# Patient Record
Sex: MALE | Race: WHITE | NOT HISPANIC OR LATINO | Employment: OTHER | URBAN - METROPOLITAN AREA
[De-identification: names, ages, dates, MRNs, and addresses within clinical notes are randomized per-mention and may not be internally consistent; named-entity substitution may affect disease eponyms.]

---

## 2017-04-19 ENCOUNTER — COMMUNICATION - HEALTHEAST (OUTPATIENT)
Dept: INTERNAL MEDICINE | Facility: CLINIC | Age: 62
End: 2017-04-19

## 2017-04-19 DIAGNOSIS — I10 ESSENTIAL HYPERTENSION: ICD-10-CM

## 2017-04-20 ENCOUNTER — COMMUNICATION - HEALTHEAST (OUTPATIENT)
Dept: INTERNAL MEDICINE | Facility: CLINIC | Age: 62
End: 2017-04-20

## 2017-04-23 ENCOUNTER — COMMUNICATION - HEALTHEAST (OUTPATIENT)
Dept: INTERNAL MEDICINE | Facility: CLINIC | Age: 62
End: 2017-04-23

## 2017-04-23 DIAGNOSIS — I10 ESSENTIAL HYPERTENSION: ICD-10-CM

## 2017-08-04 ENCOUNTER — OFFICE VISIT - HEALTHEAST (OUTPATIENT)
Dept: INTERNAL MEDICINE | Facility: CLINIC | Age: 62
End: 2017-08-04

## 2017-08-04 DIAGNOSIS — Z51.81 MEDICATION MONITORING ENCOUNTER: ICD-10-CM

## 2017-08-04 DIAGNOSIS — I10 HYPERTENSION: ICD-10-CM

## 2017-08-04 DIAGNOSIS — I45.2 RBBB (RIGHT BUNDLE BRANCH BLOCK WITH LEFT ANTERIOR FASCICULAR BLOCK): ICD-10-CM

## 2017-08-04 DIAGNOSIS — I10 ESSENTIAL HYPERTENSION WITH GOAL BLOOD PRESSURE LESS THAN 140/90: ICD-10-CM

## 2017-08-04 DIAGNOSIS — Z12.5 PROSTATE CANCER SCREENING: ICD-10-CM

## 2017-08-04 DIAGNOSIS — R35.0 URINARY FREQUENCY: ICD-10-CM

## 2017-08-04 DIAGNOSIS — Z13.6 ENCOUNTER FOR ABDOMINAL AORTIC ANEURYSM (AAA) SCREENING: ICD-10-CM

## 2017-08-04 LAB
ATRIAL RATE - MUSE: 54 BPM
CHOLEST SERPL-MCNC: 188 MG/DL
DIASTOLIC BLOOD PRESSURE - MUSE: NORMAL MMHG
FASTING STATUS PATIENT QL REPORTED: YES
HDLC SERPL-MCNC: 52 MG/DL
INTERPRETATION ECG - MUSE: NORMAL
LDLC SERPL CALC-MCNC: 123 MG/DL
P AXIS - MUSE: 24 DEGREES
PR INTERVAL - MUSE: 178 MS
PSA SERPL-MCNC: 0.5 NG/ML (ref 0–4.5)
QRS DURATION - MUSE: 146 MS
QT - MUSE: 448 MS
QTC - MUSE: 424 MS
R AXIS - MUSE: -53 DEGREES
SYSTOLIC BLOOD PRESSURE - MUSE: NORMAL MMHG
T AXIS - MUSE: 22 DEGREES
TRIGL SERPL-MCNC: 66 MG/DL
VENTRICULAR RATE- MUSE: 54 BPM

## 2017-08-04 ASSESSMENT — MIFFLIN-ST. JEOR: SCORE: 1579.12

## 2017-08-07 ENCOUNTER — COMMUNICATION - HEALTHEAST (OUTPATIENT)
Dept: INTERNAL MEDICINE | Facility: CLINIC | Age: 62
End: 2017-08-07

## 2017-10-04 ENCOUNTER — AMBULATORY - HEALTHEAST (OUTPATIENT)
Dept: NURSING | Facility: CLINIC | Age: 62
End: 2017-10-04

## 2017-10-04 DIAGNOSIS — Z23 INFLUENZA VACCINATION GIVEN: ICD-10-CM

## 2017-11-27 ENCOUNTER — OFFICE VISIT - HEALTHEAST (OUTPATIENT)
Dept: INTERNAL MEDICINE | Facility: CLINIC | Age: 62
End: 2017-11-27

## 2017-11-27 DIAGNOSIS — L40.4 PSORIASIS, GUTTATE: ICD-10-CM

## 2017-11-27 ASSESSMENT — MIFFLIN-ST. JEOR: SCORE: 1585.47

## 2018-05-22 ENCOUNTER — COMMUNICATION - HEALTHEAST (OUTPATIENT)
Dept: INTERNAL MEDICINE | Facility: CLINIC | Age: 63
End: 2018-05-22

## 2018-05-22 DIAGNOSIS — Z13.6 ENCOUNTER FOR ABDOMINAL AORTIC ANEURYSM (AAA) SCREENING: ICD-10-CM

## 2018-06-01 ENCOUNTER — COMMUNICATION - HEALTHEAST (OUTPATIENT)
Dept: VASCULAR SURGERY | Facility: CLINIC | Age: 63
End: 2018-06-01

## 2018-06-19 ENCOUNTER — COMMUNICATION - HEALTHEAST (OUTPATIENT)
Dept: VASCULAR SURGERY | Facility: CLINIC | Age: 63
End: 2018-06-19

## 2018-07-19 ENCOUNTER — COMMUNICATION - HEALTHEAST (OUTPATIENT)
Dept: INTERNAL MEDICINE | Facility: CLINIC | Age: 63
End: 2018-07-19

## 2018-07-19 ENCOUNTER — HOSPITAL ENCOUNTER (OUTPATIENT)
Dept: ULTRASOUND IMAGING | Facility: HOSPITAL | Age: 63
Discharge: HOME | End: 2018-07-19
Attending: INTERNAL MEDICINE

## 2018-07-19 DIAGNOSIS — Z13.6 ENCOUNTER FOR ABDOMINAL AORTIC ANEURYSM (AAA) SCREENING: ICD-10-CM

## 2018-09-20 ENCOUNTER — AMBULATORY - HEALTHEAST (OUTPATIENT)
Dept: NURSING | Facility: CLINIC | Age: 63
End: 2018-09-20

## 2018-09-20 DIAGNOSIS — Z23 FLU VACCINE NEED: ICD-10-CM

## 2018-09-23 ENCOUNTER — COMMUNICATION - HEALTHEAST (OUTPATIENT)
Dept: INTERNAL MEDICINE | Facility: CLINIC | Age: 63
End: 2018-09-23

## 2018-09-23 DIAGNOSIS — I10 ESSENTIAL HYPERTENSION WITH GOAL BLOOD PRESSURE LESS THAN 140/90: ICD-10-CM

## 2018-09-23 DIAGNOSIS — I10 HYPERTENSION: ICD-10-CM

## 2018-10-15 ENCOUNTER — COMMUNICATION - HEALTHEAST (OUTPATIENT)
Dept: INTERNAL MEDICINE | Facility: CLINIC | Age: 63
End: 2018-10-15

## 2018-11-26 ENCOUNTER — OFFICE VISIT - HEALTHEAST (OUTPATIENT)
Dept: INTERNAL MEDICINE | Facility: CLINIC | Age: 63
End: 2018-11-26

## 2018-11-26 DIAGNOSIS — D12.6 BENIGN NEOPLASM OF COLON, UNSPECIFIED PART OF COLON: ICD-10-CM

## 2018-11-26 DIAGNOSIS — Z12.5 SCREENING FOR PROSTATE CANCER: ICD-10-CM

## 2018-11-26 DIAGNOSIS — Z51.81 ENCOUNTER FOR MEDICATION MONITORING: ICD-10-CM

## 2018-11-26 DIAGNOSIS — I10 ESSENTIAL HYPERTENSION: ICD-10-CM

## 2018-11-26 DIAGNOSIS — I45.2 RBBB WITH LEFT ANTERIOR FASCICULAR BLOCK: ICD-10-CM

## 2018-11-26 DIAGNOSIS — R94.31 NONSPECIFIC ABNORMAL ELECTROCARDIOGRAM (ECG) (EKG): ICD-10-CM

## 2018-11-26 DIAGNOSIS — I45.2 BIFASCICULAR BUNDLE BRANCH BLOCK: ICD-10-CM

## 2018-11-26 DIAGNOSIS — Z00.00 HEALTHCARE MAINTENANCE: ICD-10-CM

## 2018-11-26 LAB
ALBUMIN SERPL-MCNC: 4.1 G/DL (ref 3.5–5)
ALBUMIN UR-MCNC: NEGATIVE MG/DL
ALP SERPL-CCNC: 52 U/L (ref 45–120)
ALT SERPL W P-5'-P-CCNC: 20 U/L (ref 0–45)
ANION GAP SERPL CALCULATED.3IONS-SCNC: 11 MMOL/L (ref 5–18)
APPEARANCE UR: CLEAR
AST SERPL W P-5'-P-CCNC: 21 U/L (ref 0–40)
BACTERIA #/AREA URNS HPF: ABNORMAL HPF
BILIRUB SERPL-MCNC: 0.6 MG/DL (ref 0–1)
BILIRUB UR QL STRIP: NEGATIVE
BUN SERPL-MCNC: 16 MG/DL (ref 8–22)
CALCIUM SERPL-MCNC: 10.1 MG/DL (ref 8.5–10.5)
CHLORIDE BLD-SCNC: 105 MMOL/L (ref 98–107)
CHOLEST SERPL-MCNC: 212 MG/DL
CO2 SERPL-SCNC: 26 MMOL/L (ref 22–31)
COLOR UR AUTO: YELLOW
CREAT SERPL-MCNC: 0.88 MG/DL (ref 0.7–1.3)
ERYTHROCYTE [DISTWIDTH] IN BLOOD BY AUTOMATED COUNT: 11.4 % (ref 11–14.5)
FASTING STATUS PATIENT QL REPORTED: NO
GFR SERPL CREATININE-BSD FRML MDRD: >60 ML/MIN/1.73M2
GLUCOSE BLD-MCNC: 86 MG/DL (ref 70–125)
GLUCOSE UR STRIP-MCNC: NEGATIVE MG/DL
HCT VFR BLD AUTO: 47.4 % (ref 40–54)
HDLC SERPL-MCNC: 61 MG/DL
HGB BLD-MCNC: 15.9 G/DL (ref 14–18)
HGB UR QL STRIP: ABNORMAL
KETONES UR STRIP-MCNC: NEGATIVE MG/DL
LDLC SERPL CALC-MCNC: 130 MG/DL
LEUKOCYTE ESTERASE UR QL STRIP: NEGATIVE
MCH RBC QN AUTO: 31.3 PG (ref 27–34)
MCHC RBC AUTO-ENTMCNC: 33.5 G/DL (ref 32–36)
MCV RBC AUTO: 93 FL (ref 80–100)
NITRATE UR QL: NEGATIVE
PH UR STRIP: 7 [PH] (ref 5–8)
PLATELET # BLD AUTO: 210 THOU/UL (ref 140–440)
PMV BLD AUTO: 6.4 FL (ref 7–10)
POTASSIUM BLD-SCNC: 4.3 MMOL/L (ref 3.5–5)
PROT SERPL-MCNC: 7.1 G/DL (ref 6–8)
PSA SERPL-MCNC: 0.4 NG/ML (ref 0–4.5)
RBC # BLD AUTO: 5.08 MILL/UL (ref 4.4–6.2)
RBC #/AREA URNS AUTO: ABNORMAL HPF
SODIUM SERPL-SCNC: 142 MMOL/L (ref 136–145)
SP GR UR STRIP: 1.02 (ref 1–1.03)
SQUAMOUS #/AREA URNS AUTO: ABNORMAL LPF
TRIGL SERPL-MCNC: 103 MG/DL
UROBILINOGEN UR STRIP-ACNC: ABNORMAL
WBC #/AREA URNS AUTO: ABNORMAL HPF
WBC: 6.8 THOU/UL (ref 4–11)

## 2018-11-27 LAB
ATRIAL RATE - MUSE: 55 BPM
DIASTOLIC BLOOD PRESSURE - MUSE: NORMAL MMHG
INTERPRETATION ECG - MUSE: NORMAL
P AXIS - MUSE: 18 DEGREES
PR INTERVAL - MUSE: 200 MS
QRS DURATION - MUSE: 136 MS
QT - MUSE: 442 MS
QTC - MUSE: 422 MS
R AXIS - MUSE: -39 DEGREES
SYSTOLIC BLOOD PRESSURE - MUSE: NORMAL MMHG
T AXIS - MUSE: 10 DEGREES
VENTRICULAR RATE- MUSE: 55 BPM

## 2018-11-28 ENCOUNTER — COMMUNICATION - HEALTHEAST (OUTPATIENT)
Dept: INTERNAL MEDICINE | Facility: CLINIC | Age: 63
End: 2018-11-28

## 2019-06-04 ENCOUNTER — OFFICE VISIT - HEALTHEAST (OUTPATIENT)
Dept: INTERNAL MEDICINE | Facility: CLINIC | Age: 64
End: 2019-06-04

## 2019-06-04 DIAGNOSIS — I10 ESSENTIAL HYPERTENSION, BENIGN: ICD-10-CM

## 2019-06-04 ASSESSMENT — MIFFLIN-ST. JEOR: SCORE: 1558.42

## 2019-08-07 ENCOUNTER — COMMUNICATION - HEALTHEAST (OUTPATIENT)
Dept: SCHEDULING | Facility: CLINIC | Age: 64
End: 2019-08-07

## 2019-08-12 ENCOUNTER — OFFICE VISIT - HEALTHEAST (OUTPATIENT)
Dept: FAMILY MEDICINE | Facility: CLINIC | Age: 64
End: 2019-08-12

## 2019-08-12 DIAGNOSIS — L30.9 DERMATITIS: ICD-10-CM

## 2019-08-12 DIAGNOSIS — B35.4 TINEA CORPORIS: ICD-10-CM

## 2019-08-12 ASSESSMENT — MIFFLIN-ST. JEOR: SCORE: 1546

## 2019-09-10 ENCOUNTER — AMBULATORY - HEALTHEAST (OUTPATIENT)
Dept: NURSING | Facility: CLINIC | Age: 64
End: 2019-09-10

## 2019-09-10 DIAGNOSIS — Z23 FLU VACCINE NEED: ICD-10-CM

## 2019-09-11 ENCOUNTER — COMMUNICATION - HEALTHEAST (OUTPATIENT)
Dept: INTERNAL MEDICINE | Facility: CLINIC | Age: 64
End: 2019-09-11

## 2019-09-11 DIAGNOSIS — I10 ESSENTIAL HYPERTENSION, BENIGN: ICD-10-CM

## 2019-09-30 ENCOUNTER — COMMUNICATION - HEALTHEAST (OUTPATIENT)
Dept: SCHEDULING | Facility: CLINIC | Age: 64
End: 2019-09-30

## 2019-12-06 ENCOUNTER — COMMUNICATION - HEALTHEAST (OUTPATIENT)
Dept: SCHEDULING | Facility: CLINIC | Age: 64
End: 2019-12-06

## 2019-12-13 ENCOUNTER — OFFICE VISIT - HEALTHEAST (OUTPATIENT)
Dept: INTERNAL MEDICINE | Facility: CLINIC | Age: 64
End: 2019-12-13

## 2019-12-13 ENCOUNTER — COMMUNICATION - HEALTHEAST (OUTPATIENT)
Dept: INTERNAL MEDICINE | Facility: CLINIC | Age: 64
End: 2019-12-13

## 2019-12-13 DIAGNOSIS — K59.01 SLOW TRANSIT CONSTIPATION: ICD-10-CM

## 2019-12-13 DIAGNOSIS — Z80.0 FHX: COLON CANCER: ICD-10-CM

## 2019-12-13 DIAGNOSIS — Z12.11 SCREEN FOR COLON CANCER: ICD-10-CM

## 2019-12-13 LAB
ERYTHROCYTE [DISTWIDTH] IN BLOOD BY AUTOMATED COUNT: 10.9 % (ref 11–14.5)
HCT VFR BLD AUTO: 47.9 % (ref 40–54)
HGB BLD-MCNC: 16.2 G/DL (ref 14–18)
MCH RBC QN AUTO: 32.8 PG (ref 27–34)
MCHC RBC AUTO-ENTMCNC: 33.9 G/DL (ref 32–36)
MCV RBC AUTO: 97 FL (ref 80–100)
PLATELET # BLD AUTO: 255 THOU/UL (ref 140–440)
PMV BLD AUTO: 6.7 FL (ref 7–10)
RBC # BLD AUTO: 4.94 MILL/UL (ref 4.4–6.2)
TSH SERPL DL<=0.005 MIU/L-ACNC: 1.3 UIU/ML (ref 0.3–5)
WBC: 5.5 THOU/UL (ref 4–11)

## 2020-03-04 ENCOUNTER — COMMUNICATION - HEALTHEAST (OUTPATIENT)
Dept: SCHEDULING | Facility: CLINIC | Age: 65
End: 2020-03-04

## 2020-03-04 ENCOUNTER — OFFICE VISIT - HEALTHEAST (OUTPATIENT)
Dept: FAMILY MEDICINE | Facility: CLINIC | Age: 65
End: 2020-03-04

## 2020-03-04 DIAGNOSIS — R52 BODY ACHES: ICD-10-CM

## 2020-03-04 DIAGNOSIS — R50.9 FEVER, UNSPECIFIED FEVER CAUSE: ICD-10-CM

## 2020-03-04 LAB
FLUAV AG SPEC QL IA: NORMAL
FLUBV AG SPEC QL IA: NORMAL

## 2020-03-04 ASSESSMENT — MIFFLIN-ST. JEOR: SCORE: 1566.64

## 2020-04-06 ENCOUNTER — RECORDS - HEALTHEAST (OUTPATIENT)
Dept: ADMINISTRATIVE | Facility: OTHER | Age: 65
End: 2020-04-06

## 2020-08-20 ENCOUNTER — COMMUNICATION - HEALTHEAST (OUTPATIENT)
Dept: INTERNAL MEDICINE | Facility: CLINIC | Age: 65
End: 2020-08-20

## 2020-08-20 DIAGNOSIS — Z23 NEED FOR SHINGLES VACCINE: ICD-10-CM

## 2020-09-18 ENCOUNTER — OFFICE VISIT - HEALTHEAST (OUTPATIENT)
Dept: FAMILY MEDICINE | Facility: CLINIC | Age: 65
End: 2020-09-18

## 2020-09-18 ENCOUNTER — COMMUNICATION - HEALTHEAST (OUTPATIENT)
Dept: TELEHEALTH | Facility: CLINIC | Age: 65
End: 2020-09-18

## 2020-09-18 DIAGNOSIS — M77.51 TENDINITIS OF RIGHT ANKLE: ICD-10-CM

## 2020-09-18 DIAGNOSIS — M25.571 CHRONIC PAIN OF RIGHT ANKLE: ICD-10-CM

## 2020-09-18 DIAGNOSIS — G89.29 CHRONIC PAIN OF RIGHT ANKLE: ICD-10-CM

## 2020-09-23 ENCOUNTER — COMMUNICATION - HEALTHEAST (OUTPATIENT)
Dept: INTERNAL MEDICINE | Facility: CLINIC | Age: 65
End: 2020-09-23

## 2020-09-23 DIAGNOSIS — I10 ESSENTIAL HYPERTENSION, BENIGN: ICD-10-CM

## 2020-11-25 ENCOUNTER — OFFICE VISIT - HEALTHEAST (OUTPATIENT)
Dept: FAMILY MEDICINE | Facility: CLINIC | Age: 65
End: 2020-11-25

## 2020-11-25 DIAGNOSIS — Z20.822 EXPOSURE TO COVID-19 VIRUS: ICD-10-CM

## 2021-05-23 ENCOUNTER — HEALTH MAINTENANCE LETTER (OUTPATIENT)
Age: 66
End: 2021-05-23

## 2021-05-28 ENCOUNTER — RECORDS - HEALTHEAST (OUTPATIENT)
Dept: ADMINISTRATIVE | Facility: CLINIC | Age: 66
End: 2021-05-28

## 2021-05-29 NOTE — PROGRESS NOTES
ASSESSMENT:  1. Essential hypertension, benign  Vinod had losartan increased to 50 mg daily at last visit.  Most readings at home have been above goal since then.  Recheck by MD also was high.  I would favor increasing the losartan dose.  - losartan (COZAAR) 100 MG tablet; Take 1 tablet (100 mg total) by mouth daily.  Dispense: 90 tablet; Refill: 3    2.  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  Increase Losartan to 100 mg daily    2.  Report BP readings in one month    3.  See me in three months        Medications Discontinued During This Encounter   Medication Reason     losartan (COZAAR) 50 MG tablet Dose adjustment       Return in about 3 months (around 9/4/2019) for Recheck.      ASSESSED PROBLEMS:  1. Essential hypertension, benign  losartan (COZAAR) 100 MG tablet       CHIEF COMPLAINT:  Chief Complaint   Patient presents with     Follow-up       HISTORY OF PRESENT ILLNESS:  Darion is a 64 y.o. male with a history of hypertension presenting to the clinic today for a follow up.     Hypertension: He was under a lot of stressed due to work,and was suppose to be seen in Feb. He started recording his blood pressure in April. It was high, but has been decreasing. He is taking 50mg losartan to regulate his hypertension, and states that it has been working. He tolerates the losartan well. The patient also notes that he has been walking and doing chores around the house to try to help regulate his blood pressure.    Hand Mass: He has a bump on the palmar aspect of his left hand that popped up a month ago. It is not painful. He is left handed.     REVIEW OF SYSTEMS:   His heart has been normal, he denied any fluttering.  All other systems are negative.    PFSH:  He is starting work with the [employer] soon.    TOBACCO USE:  Social History     Tobacco Use   Smoking Status  "Former Smoker     Years: 3.00     Types: Cigarettes   Smokeless Tobacco Never Used       VITALS:  Vitals:    06/04/19 0814 06/04/19 0838   BP: 122/76 (!) 146/96   Patient Site: Left Arm Right Arm   Patient Position: Sitting Sitting   Cuff Size: Adult Regular    Pulse: (!) 58    SpO2: 99%    Weight: 172 lb 7 oz (78.2 kg)    Height: 5' 10\" (1.778 m)      Wt Readings from Last 3 Encounters:   06/04/19 172 lb 7 oz (78.2 kg)   11/26/18 180 lb (81.6 kg)   11/27/17 185 lb 6.4 oz (84.1 kg)     Body mass index is 24.74 kg/m .    PHYSICAL EXAM:  Repeat bp by MD: 146/96, rue, sitting  Constitutional:   Reveals an alert pleasant talkative male, affect appropriate, does not appear acutely ill.  Vitals: per nursing notes.  Neck:  Supple, no carotid bruits or adenopathy.  Back:  No spine or CVA pain.  Thorax:  No bony deformities.  Lungs: Clear to A&P without rales or wheezes.  Respiratory effort normal.  Cardiac:   Regular rate and rhythm, normal S1, S2, no murmur or gallop.  Abdomen: Soft non-tender.  Extremities:   No edema    Skin: Clear  Psychiatric:  Memory intact, mood appropriate.    ADDITIONAL HISTORY SUMMARIZED (2): None.  DECISION TO OBTAIN EXTRA INFORMATION (1): None.   RADIOLOGY TESTS (1): None.  LABS (1): Reviewed labs.  MEDICINE TESTS (1): None.  INDEPENDENT REVIEW (2 each): None.     The visit lasted a total of 14 minutes face to face with the patient. Over 50% of the time was spent counseling and educating the patient about hypertension.    ILuz, am scribing for and in the presence of, Dr. Veras.    Josiah VAN, personally performed the services described in this documentation, as scribed by Luz Hutson in my presence, and it is both accurate and complete.    Dragon dictation was used for this note.  Speech recognition errors are a possibility.    MEDICATIONS:  Current Outpatient Medications   Medication Sig Dispense Refill     ibuprofen (ADVIL,MOTRIN) 200 MG tablet Take 400 mg by mouth every 6 (six) " hours as needed for pain.       Lactobacillus acidophilus (PROBIOTIC) 10 billion cell capsule Take by mouth.       triamcinolone (KENALOG) 0.1 % cream Apply twice daily to involved area until resolved 80 g 1     losartan (COZAAR) 100 MG tablet Take 1 tablet (100 mg total) by mouth daily. 90 tablet 3     No current facility-administered medications for this visit.        Total data points: 1

## 2021-05-29 NOTE — PATIENT INSTRUCTIONS - HE
.  Increase Losartan to 100 mg daily    2.  Report BP readings in one month    3.  See me in three months

## 2021-05-31 VITALS — BODY MASS INDEX: 27.89 KG/M2 | HEIGHT: 68 IN | WEIGHT: 184 LBS

## 2021-05-31 VITALS — WEIGHT: 185.4 LBS | BODY MASS INDEX: 28.1 KG/M2 | HEIGHT: 68 IN

## 2021-05-31 NOTE — PROGRESS NOTES
Is good day  Assessment:     Darion was seen today for rash.    Diagnoses and all orders for this visit:    Tinea corporis    Dermatitis  -     ketoconazole (NIZORAL) 2 % cream; Apply twice a day for 10 days.          Plan:     1. Dermatitis  To use the ketoconazole cream to this tinea dermatitis over the next month.  I expect this to improve.  If he is having difficulty getting resolution of this tinea to come back and we will consider ketoconazole tablets.        Subjective:      Fred is a 64 y.o. male presenting to my clinic for evaluation of a rash on top of his right foot.  He has a circular rash that he says it started off the size of an eraser and now is the size of a quarter.  He does not typically wear shoes with any metal on them as I wonder about metal contact dermatitis.  He has not had a blister in his this spot though currently a layer of skin is peeling off the top.    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  I discussed use of topical antifungal first and if not sufficient switching to an oral antifungal..    Will see next week or you will see the following week      Current Outpatient Medications on File Prior to Visit   Medication Sig Dispense Refill     Lactobacillus acidophilus (PROBIOTIC) 10 billion cell capsule Take by mouth.       losartan (COZAAR) 100 MG tablet Take 1 tablet (100 mg total) by mouth daily. 90 tablet 3     ibuprofen (ADVIL,MOTRIN) 200 MG tablet Take 400 mg by mouth every 6 (six) hours as needed for pain.       triamcinolone (KENALOG) 0.1 % cream Apply twice daily to involved area until resolved 80 g 1     No current facility-administered medications on file prior to visit.      Allergies   Allergen Reactions     Lisinopril      Annotation: cough       Past Medical History:   Diagnosis Date     Fracture, rib      Past Surgical History:   Procedure Laterality Date     NO PAST SURGERIES       Social History     Socioeconomic  "History     Marital status:      Spouse name: Not on file     Number of children: Not on file     Years of education: Not on file     Highest education level: Not on file   Occupational History     Not on file   Social Needs     Financial resource strain: Not on file     Food insecurity:     Worry: Not on file     Inability: Not on file     Transportation needs:     Medical: Not on file     Non-medical: Not on file   Tobacco Use     Smoking status: Former Smoker     Years: 3.00     Types: Cigarettes     Smokeless tobacco: Never Used   Substance and Sexual Activity     Alcohol use: No     Drug use: No     Sexual activity: Not on file   Lifestyle     Physical activity:     Days per week: Not on file     Minutes per session: Not on file     Stress: Not on file   Relationships     Social connections:     Talks on phone: Not on file     Gets together: Not on file     Attends Yarsanism service: Not on file     Active member of club or organization: Not on file     Attends meetings of clubs or organizations: Not on file     Relationship status: Not on file     Intimate partner violence:     Fear of current or ex partner: Not on file     Emotionally abused: Not on file     Physically abused: Not on file     Forced sexual activity: Not on file   Other Topics Concern     Not on file   Social History Narrative     Not on file     Family History   Problem Relation Age of Onset     Cancer Mother      Cancer Father        ROS:  I have performed a 10 point ROS.  All pertinent positives and negatives are found in the HPI.  All others are negative.      Objective:     Physical Exam:  /72 (Patient Site: Right Arm, Patient Position: Sitting, Cuff Size: Adult Regular)   Pulse 60   Temp 98  F (36.7  C)   Resp 16   Ht 5' 10\" (1.778 m)   Wt 169 lb 11.2 oz (77 kg)   BMI 24.35 kg/m    General Appearance: Alert, cooperative, no distress, appears stated age      Extremities: Extremities normal, atraumatic, no cyanosis or " edema/  Rash on top of okay left right left rightright foot  That was his right foot or his right foot  Skin: Skin color, texture, turgor normal,     Lesion on the dorsum of the right foot is approximately a centimeter and a half in diameter.  Erythema erythematous overall with well demarcated edges.  A thin layer of skin over the top is blistering in a bullous-like fashion.  No weeping or drainage.

## 2021-05-31 NOTE — TELEPHONE ENCOUNTER
Patient calling - says about a month ago he noticed a round flat red area about the size of pencil eraser on top of his left foot.  This area has slowly increased in size over the last month.  The flat red area is now about the size of a dime.      No fever. No pain.  No itching.     Triaged to disposition of See PCP When Office is Open (Within 3 Days).  Patient states he is out of town in [facility] [place] until next [place].  Requesting appointment for Monday afternoon.  Caller warm transferred to scheduling.  No appointments available with Dr. Veras until 9/13/19.  Scheduled for Monday 8/12/19 at [facility].    Chiquita Salmon, OSBALDO  Triage Nurse Advisor    Reason for Disposition    Localized rash present > 7 days    Protocols used: RASH OR REDNESS - [id number]

## 2021-06-01 ENCOUNTER — COMMUNICATION - HEALTHEAST (OUTPATIENT)
Dept: SCHEDULING | Facility: CLINIC | Age: 66
End: 2021-06-01

## 2021-06-01 NOTE — TELEPHONE ENCOUNTER
Medication Question or Clarification  Who is calling: Pharmacy: Jennifer FITZPATRICK Pharmacist with [facility]  What medication are you calling about? (include dose and sig)     losartan (COZAAR) 100 MG tablet 90 tablet 3 6/4/2019     Sig - Route: Take 1 tablet (100 mg total) by mouth daily. - Oral      Who prescribed the medication?: pcp  What is your question/concern?: Since being on this dose of medication, patient reports experiencing about 5 episodes of dizziness per month. Blood pressures have been 120 over 80's non-resting. Pharmacist recommends patient trying a 75 mg dose to see if that eliminates his symptoms. Please advise!  Pharmacy: [facility]  Okay to leave a detailed message?: Yes, [phone]  Site CMT - Please call the pharmacy to obtain any additional needed information.

## 2021-06-01 NOTE — TELEPHONE ENCOUNTER
OK to cut the losartan to 75 mg daily.  (Blood pressure previously was above goal on 50 mg daily).  He would need to switch to 50 mg tab and take 1-1/2 daily.  If he has difficulty cutting the pill, he would need to take a 50 mg and separate 25 mg tab.

## 2021-06-01 NOTE — TELEPHONE ENCOUNTER
Pt calls with updated BP readings since starting new losartan dose -> 75 mg daily.    6/18/19:  122/76  HR 68    7/16/19:  121/89  HR 78    8/12/19:  118/72  HR 60    9/27/19:  120/88  HR 76    Pt feels well on 75 mg losartan daily.  No dizziness or other symptoms.  Started this new 75 mg daily dose 9/25/19 (approx five days ago).  Intends to continue this dose, confirmed in current med list.    Trupti Corona RN BS  [facility] RN Triage     Reason for Disposition    [1] Other NON-URGENT information for PCP AND [2] does not require PCP response    Protocols used: PCP CALL - NO TRIAGE-A-

## 2021-06-01 NOTE — TELEPHONE ENCOUNTER
Patient Returning Call  Reason for call:  Returning call  Information relayed to patient:    Relayed below information to patient.  Patient has additional questions:  Yes  If YES, what are your questions/concerns:    Patient states he will try cutting the losartan tablet in half for now.  Patient will let the Provider know if he wants the 25 mg tablet.  Okay to leave a detailed message?: No call back needed

## 2021-06-02 VITALS — WEIGHT: 180 LBS | BODY MASS INDEX: 27.37 KG/M2

## 2021-06-03 VITALS — HEIGHT: 70 IN | BODY MASS INDEX: 24.69 KG/M2 | WEIGHT: 172.44 LBS

## 2021-06-03 VITALS — HEIGHT: 70 IN | WEIGHT: 169.7 LBS | BODY MASS INDEX: 24.29 KG/M2

## 2021-06-04 VITALS
OXYGEN SATURATION: 98 % | HEART RATE: 60 BPM | DIASTOLIC BLOOD PRESSURE: 82 MMHG | SYSTOLIC BLOOD PRESSURE: 138 MMHG | WEIGHT: 173.8 LBS | BODY MASS INDEX: 24.94 KG/M2

## 2021-06-04 VITALS
TEMPERATURE: 98.8 F | HEIGHT: 70 IN | DIASTOLIC BLOOD PRESSURE: 74 MMHG | OXYGEN SATURATION: 98 % | RESPIRATION RATE: 16 BRPM | SYSTOLIC BLOOD PRESSURE: 110 MMHG | WEIGHT: 174.25 LBS | BODY MASS INDEX: 24.95 KG/M2 | HEART RATE: 88 BPM

## 2021-06-04 NOTE — PROGRESS NOTES
ASSESSMENT:  1. Screen for colon cancer  Colonoscopy will be scheduled  - Ambulatory referral for Colonoscopy    2. FHx: colon cancer  Family history is notable for mother with colon cancer  - Ambulatory referral for Colonoscopy    3. Slow transit constipation  Darion chronically has a bowel movement every 2 to 3 days.  This has been worse over the past several months.  Conservative management was reviewed.  He is concerned due to the family history of mother with colon cancer.  He also will be screened for hypothyroidism  - Ambulatory referral for Colonoscopy  - Thyroid Stimulating Hormone (TSH)  - HM2(CBC w/o Differential)    4.  Essential hypertension  Good control with losartan 75 mg daily    PLAN:  Patient Instructions   1. Use Citrucel or similar fiber supplement daily.    2.  Add Miralax as needed    3.  Schedule colonoscopy    4.  Check TSH and hemoglobin.    5.  See for Annual wellness check in future.    6.  Pneumo-23 vaccine today for health maintenance      Orders Placed This Encounter   Procedures     Pneumococcal polysaccharide vaccine 23-valent 1 yo or older, subq/IM     Thyroid Stimulating Hormone (TSH)     HM2(CBC w/o Differential)     Ambulatory referral for Colonoscopy     Referral Priority:   Routine     Referral Type:   Colonoscopy     Referral Reason:   Evaluation and Treatment     Requested Specialty:   Gastroenterology     Number of Visits Requested:   1     There are no discontinued medications.    Return in about 4 weeks (around 1/10/2020) for Annual physical.      ASSESSED PROBLEMS:  1. Screen for colon cancer  Ambulatory referral for Colonoscopy   2. FHx: colon cancer  Ambulatory referral for Colonoscopy   3. Slow transit constipation  Ambulatory referral for Colonoscopy    Thyroid Stimulating Hormone (TSH)    HM2(CBC w/o Differential)       CHIEF COMPLAINT:  Chief Complaint   Patient presents with     Constipation       HISTORY OF PRESENT ILLNESS:  Darion is a 64 y.o. male presenting to  the clinic today with complaints of constipation.     Constipation: He states he has never had regular bowel movements, but there was recently a change in his pattern. He used to go every 3-4 days, but this suddenly switched to every 5-6 days before Thanksgiving. There was no associated cramping, and his appetite was still good, but he was just going less frequently. He decided to take magnesium citrate after going quite a while without a movement, and that worked within about four hours. He then started to have small movements in the days following that treatment. He also had some lower abdominal cramping during that time. He was not taking Citrucel or anything else for his bowels for a little while, but he then started taking Citrucel again and played around with Senna S and Miralax. The Miralax seemed to work very well on Monday and Tuesday of this week. It almost worked too well as he developed some hemorrhoid problems. He has not had a bowel movement since Tuesday night now. His mother had colon cancer, which concerns him, and he is due for a colonoscopy himself.     Thumb Pain: He started to notice a sharp pain at the base of his right thumb about one month ago. It worsened to the point that it became difficult to grasp things. The joint appears visibly larger than on his left thumb. He wonders if this could be arthritis. He is left handed. There is no numbness in the thumb.     Hypertension: His blood pressure has been okay lately. He typically gets readings around 128/80 at home. He has been taking losartan 75 mg daily.     Health Maintenance: He already got the flu shot this year. He has not had the Shingrix vaccine series yet. He will get a Pneumo-23 booster today.     REVIEW OF SYSTEMS:   He has had some painful hemorrhoids lately. All other systems are negative.    PFSH:  He started working part time at [employer].     TOBACCO USE:  Social History     Tobacco Use   Smoking Status Former Smoker      Years: 3.00     Types: Cigarettes   Smokeless Tobacco Never Used       VITALS:  Vitals:    12/13/19 0834   BP: 138/82   Patient Site: Left Arm   Patient Position: Sitting   Cuff Size: Adult Regular   Pulse: 60   SpO2: 98%   Weight: 173 lb 12.8 oz (78.8 kg)     Wt Readings from Last 3 Encounters:   12/13/19 173 lb 12.8 oz (78.8 kg)   08/12/19 169 lb 11.2 oz (77 kg)   06/04/19 172 lb 7 oz (78.2 kg)     Body mass index is 24.94 kg/m .    PHYSICAL EXAM:  Constitutional:   Reveals an alert, talkative, healthy appearing male. Affect appropriate. Does not appear acutely ill.  Vitals: per nursing notes.  Neck:  Supple, no carotid bruits or adenopathy.  Back:  No spine or CVA pain.  Thorax:  No bony deformities.  Lungs: Clear to A&P without rales or wheezes.  Respiratory effort normal.  Cardiac:   Regular rate and rhythm, normal S1, S2, no murmur or gallop.  Abdomen:  Soft, active bowel sounds without bruits, mass, or tenderness.  Extremities: No edema. Thickening of the first MCP joint right hand with no weakness or locking of finger noted.   Skin: Clear.  Psychiatric:  Memory intact, mood appropriate.    ADDITIONAL HISTORY SUMMARIZED (2): None.  DECISION TO OBTAIN EXTRA INFORMATION (1): None.   RADIOLOGY TESTS (1): None.  LABS (1): Labs from 11/26/2018  reviewed. Labs ordered.   MEDICINE TESTS (1): None.  INDEPENDENT REVIEW (2 each): None.     The visit lasted a total of 23 minutes face to face with the patient. Over 50% of the time was spent counseling and educating the patient about his constipation.    IAbiodun, am scribing for and in the presence of, Dr. Veras.    Josiah VAN, personally performed the services described in this documentation, as scribed by Abiodun Chaney in my presence, and it is both accurate and complete.    Dragon dictation was used for this note.  Speech recognition errors are a possibility.    MEDICATIONS:  Current Outpatient Medications   Medication Sig Dispense Refill      ibuprofen (ADVIL,MOTRIN) 200 MG tablet Take 400 mg by mouth every 6 (six) hours as needed for pain.       losartan (COZAAR) 50 MG tablet Take 1.5 tablets (75 mg total) by mouth daily. 135 tablet 3     ketoconazole (NIZORAL) 2 % cream Apply twice a day for 10 days. 60 g 1     Lactobacillus acidophilus (PROBIOTIC) 10 billion cell capsule Take by mouth.       triamcinolone (KENALOG) 0.1 % cream Apply twice daily to involved area until resolved 80 g 1     No current facility-administered medications for this visit.        Total data points: 1

## 2021-06-04 NOTE — TELEPHONE ENCOUNTER
Triage call:   Calling with concerns with constipation  - 11/23- regular BM   3-4 days between BMs normally    11/29 had not had a BM- has hemmhroids   Mag Citrate 11/29- worked 5-6 hours later   Since then has had small BM on 12/3 and today - had to strain more than usual   Citrucel 11/29 and took Senna-S 12/2 and 12/6   Intermittent sharp abdominal pain on the right side  Still has an appeitite   Reports a fullness/bloated feeling  Passes gas   Can feel gurgling in his belly  No changes in diet  No nausea    Reports that his Mother had cancer and blockages in her intestines-   Reports he thinks that he is due next year for a colonoscopy- last was normal.       Triaged to be seen within the next 3 days- patient prefers PCP only due to this being a very personal matter to him and concerns with family hx and cancer. First available appointment with PCP is on 12/13/19 @ 8:45 am- patient is hoping PCP can fit him in earlier. PCP please advise on an earlier appointment.    Reviewed additional care advice with patient and he verbalizes understanding. Also advised that if his symptoms were to get worse or change that he should call back to discuss with Triage. Also advised that the [facility] is available over the weekend for assistance if necessary.         Cee Glass RN BSBA [facility] Triage/Med Refill 12/6/2019 9:27 AM    Reason for Disposition    Unable to have a bowel movement (BM) without using a laxative, suppository, or enema    Protocols used: CONSTIPATION-A-[place]

## 2021-06-04 NOTE — PATIENT INSTRUCTIONS - HE
1. Use Citrucel or similar fiber supplement daily.    2.  Add Miralax as needed    3.  Schedule colonoscopy    4.  Check TSH and hemoglobin.    5.  See for Annual wellness check in future.    6.  Pneumo-23 vaccine today

## 2021-06-04 NOTE — TELEPHONE ENCOUNTER
Spoke with pt and relayed pcp message.  Pt understanding and agreeable.  Pt scheduled with pcp on 12/13/19.

## 2021-06-05 VITALS
HEART RATE: 60 BPM | SYSTOLIC BLOOD PRESSURE: 130 MMHG | WEIGHT: 180 LBS | DIASTOLIC BLOOD PRESSURE: 82 MMHG | BODY MASS INDEX: 25.83 KG/M2 | RESPIRATION RATE: 12 BRPM

## 2021-06-06 NOTE — TELEPHONE ENCOUNTER
RN triage  Call from pt   Pt states fever since Saturday @ 0130 -- up to 100.6 -   Takes 3 ibuprofen-- fever comes down -- then 10 hrs later -- increased temperature   Gets chilled   Today T = 99.9  Tender to touch along lower ribcage - along front --   Had bad cough for 3+ weeks starting early January -- and still has lingering cough   No diff breathing   No rash   No bladder symptoms   Drinking fluids well has sl stiff neck when fever -- no neck pain -- able to do chin to chest now   Reviewed home care advice   Per protocol = should be seen   Transferred to    Donya Saexna RN BAN [facility] RN triage          Reason for Disposition    Fever present > 3 days (72 hours)    Protocols used: FEVER-A-[place]

## 2021-06-06 NOTE — PROGRESS NOTES
"Assessment / Impression     1. Fever, unspecified fever cause  Influenza A/B Rapid Test   2. Body aches           Plan:     We checked a rapid influenza test which was negative.  His symptoms were most likely due to a viral illness.  I recommended symptomatic cares and encouraged rest and hydration.  He is in no respiratory distress on exam and his oxygen saturation is 98% on room air.  If symptoms become acutely worse he will seek medical attention.    Subjective:      HPI: Darion Day is a 64 y.o. male who presents to the clinic complaining of low-grade fevers, chills and body aches that he has had over the past 5 days.  He denies significant congestion.  He has had a mild cough for the past 3 weeks.  He attributes this to a lingering cough from a bad cold he had in late January.  He denies having history of asthma or wheezing.  He denies dysuria or hematuria.  He denies having a sore throat.  He has been taking ibuprofen which provides short-term benefit.      Review of Systems  Pertinent items are noted in HPI.       Objective:     /74 (Patient Site: Right Arm, Patient Position: Sitting, Cuff Size: Adult Regular)   Pulse 88   Temp 98.8  F (37.1  C) (Oral)   Resp 16   Ht 5' 10\" (1.778 m)   Wt 174 lb 4 oz (79 kg)   SpO2 98% Comment: RA  BMI 25.00 kg/m      General Appearance: Alert, cooperative, appears slightly fatigued.  Head: Normocephalic, without obvious abnormality, atraumatic.  Eyes: PERRL, conjunctiva/corneas clear, EOM's intact.  Ears: Normal TM's and external ear canals, both ears.  Throat: Slightly erythematous. No exudates.  Neck: Supple, symmetrical, trachea midline, no lymphadenopathy.  Lungs: Clear to auscultation bilaterally, respirations unlabored.  No wheezing.  Heart:: Regular rate and rhythm.  Extremities: Extremities normal, atraumatic, no cyanosis or edema.        No results found for this or any previous visit (from the past 168 hour(s)).      "

## 2021-06-10 NOTE — TELEPHONE ENCOUNTER
Who is calling:  Patient  Reason for Call:  I was told by insurance that a prior authorization was need for a shingles vaccine. Please enter this PA.  Also would Josiah Veras MD recommend I even get a shingles vaccine due to the Covid 19 going on now? Please return call to patient to discuss both concerns.  Date of last appointment with primary care: 3/4/2020  Okay to leave a detailed message: Yes

## 2021-06-11 NOTE — TELEPHONE ENCOUNTER
Who is calling:  The patient   Reason for Call:  The patient is checking on the status of the prior authorization for the Shingles vaccine.  (See messages below). The patient states he has not heard back from Josiah Veras MD's team. This writer informed the patient Josiah Veras MD advises the Shingrix. The patient would like a call.  Date of last appointment with primary care:   Okay to leave a detailed message: Yes

## 2021-06-11 NOTE — TELEPHONE ENCOUNTER
RN cannot approve Refill Request    RN can NOT refill this medication Protocol failed and NO refill given. Last office visit: 12/13/2019 Josiah Veras MD Last Physical: 8/4/2017 Last MTM visit: Visit date not found Last visit same specialty: 12/13/2019 Josiah Veras MD.  Next visit within 3 mo: Visit date not found  Next physical within 3 mo: Visit date not found      Dina Vasquez, [facility] Triage/Med Refill 9/25/2020    Requested Prescriptions   Pending Prescriptions Disp Refills     losartan (COZAAR) 50 MG tablet [Pharmacy Med Name: Losartan Potassium Oral Tablet 50 MG] 135 tablet 0     Sig: TAKE 1 AND 1/2 TABLET BY MOUTH DAILY       Angiotensin Receptor Blocker Protocol Failed - 9/23/2020  8:44 AM        Failed - Serum potassium within the past 12 months     No results found for: LN-POTASSIUM          Failed - Serum creatinine within the past 12 months     Creatinine   Date Value Ref Range Status   11/26/2018 0.88 0.70 - 1.30 mg/dL Final             Passed - PCP or prescribing provider visit in past 12 months       Last office visit with prescriber/PCP: 12/13/2019 Josiah Veras MD OR same dept: 12/13/2019 Josiah Veras MD OR same specialty: 12/13/2019 Josiah Veras MD  Last physical: 8/4/2017 Last MTM visit: Visit date not found   Next visit within 3 mo: Visit date not found  Next physical within 3 mo: Visit date not found  Prescriber OR PCP: Josiah Veras MD  Last diagnosis associated with med order: 1. Essential hypertension, benign  - losartan (COZAAR) 50 MG tablet [Pharmacy Med Name: Losartan Potassium Oral Tablet 50 MG]; TAKE 1 AND 1/2 TABLET BY MOUTH DAILY   Dispense: 135 tablet; Refill: 0    If protocol passes may refill for 12 months if within 3 months of last provider visit (or a total of 15 months).             Passed - Blood pressure filed in past 12 months     BP Readings from Last 1 Encounters:   09/18/20 130/82

## 2021-06-11 NOTE — PATIENT INSTRUCTIONS - HE
I'm most concerned about ankle arthritis, as you suspect as well.  I'd recommend an xray of the ankle today.  I should have results back later today and will call or email those to you.  If it does show arthritis, then I would likely recommend consideration of a visit with a foot and ankle specialist to consider an injection, if you'd like

## 2021-06-11 NOTE — PROGRESS NOTES
"Subjective     Darion Day is a 65 y.o. male who presents to clinic today for the following health issues:    HPI   Right ankle pain x 1 year -  Patient reports stiff feeling at the right ankle ongoing for the past year, at least. Pain seems to be most severe early in the day, and will feel as if it \"loosens up\" as the day progresses.  No associated injuries that patient can recall.  No discomfort noted when not weight bearing on the ankle. No new footwear prior to onset of pain. No swelling or redness noted at ankle. Patient tries to be fairly active, but notes that the pain has been starting to limit his performance of some activities. Patient reports no history of arthritis in other joints.  He notes that he has always been very active so has potentially placed significant stress on his joints over time. He is not treating his ankle pain with any medications or home therapies currently.       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     Essential hypertension     Arthritis     Anal fissure     Polyp of colon     Diverticular disease of colon     Past Surgical History:   Procedure Laterality Date     NO PAST SURGERIES         Social History     Tobacco Use     Smoking status: Former Smoker     Years: 3.00     Types: Cigarettes     Smokeless tobacco: Never Used   Substance Use Topics     Alcohol use: No     Family History   Problem Relation Age of Onset     Cancer Mother      Cancer Father          Current Outpatient Medications   Medication Sig Dispense Refill     ibuprofen (ADVIL,MOTRIN) 200 MG tablet Take 400 mg by mouth every 6 (six) hours as needed for pain.       polyethylene glycol (MIRALAX) 17 gram/dose powder Take by mouth.       losartan (COZAAR) 50 MG tablet TAKE 1 AND 1/2 TABLET BY MOUTH DAILY  135 tablet 3     No current facility-administered medications for this visit.      Allergies   Allergen " Reactions     Lisinopril      Annotation: cough       Review of Systems   No history of autoimmune processes.  No recent fevers, chills, sweats.  No recent cough or cold symptoms.  Remainder of review of systems is negative except as noted above.      Objective    /82   Pulse 60   Resp 12   Wt 180 lb (81.6 kg)   BMI 25.83 kg/m    Body mass index is 25.83 kg/m .  Physical Exam   Gen: Alert/oriented to person/place.  Affect: Pleasant, cooperative.  Right ankle: Normal AROM, no joint laxity, strength is symmetric to the left ankle.  Tender to palpation at the medial malleolus.  No other areas of tenderness. Mild discomfort noted with inversion against resistance.   Skin: No redness noted in or around the right ankle.  Mild swelling about the right medial malleolus.    Right ankle xray:   IMPRESSION:  1.  Slight soft tissue swelling about the medial malleolus. No evidence for  fracture. Plantar calcaneal spurring. The ankle mortise is intact.     Assessment & Plan     Problem List Items Addressed This Visit     None      Visit Diagnoses     Chronic pain of right ankle    -  Primary    Relevant Orders    XR Ankle Right 3 or More VWS (Completed)    Tendinitis of right ankle          patient has already received his influenza vaccine this year.  Immunization list updated.     Patient instructions:   For tendinitis I would typically recommend some home ankle strengthening/stretching exercises and the use of an arch support insert for your shoes.  I generally would recommend Spenco arch supports, which are widely available at [employer] (though I'm sure there are many other equally effective brands on the market, as well).  I'll mail you some exercises to consider.   If you don't find those interventions to be helpful within the next 4-6 weeks, please let me know, as I would probably recommend you consider seeing a physical therapist at that point.   If you have any questions or new concerns, please don't hesitate  "to let us know.     BMI:   Estimated body mass index is 25.83 kg/m  as calculated from the following:    Height as of 3/4/20: 5' 10\" (1.778 m).    Weight as of this encounter: 180 lb (81.6 kg).   The following high BMI interventions were performed this visit: encouragement to exercise and lifestyle education regarding diet    Return in about 6 weeks (around 10/30/2020), or if symptoms worsen or fail to improve.    Elton Olivo MD  [facility]      "

## 2021-06-11 NOTE — TELEPHONE ENCOUNTER
Spoke with pt and explained that the clinic does not do prior authorizations on vaccines.  Advised pt to contact his insurance again, and ask where he can get the vaccine and it's covered.  Advised pt that LPN suspects his insurance would want him to have it at the pharmacy.  Pt understanding.    Pt also wanted to make sure getting the flu shot wouldn't weaken his immune system to getting Covid.  Informed pt this was not so, and that he should get his flu shot.  Pt understanding.

## 2021-06-12 NOTE — PROGRESS NOTES
TIME IN: 8:12 AM  TIME OUT:8:50      ASSESSMENT:  1.  Health maintenance body fat is somewhat above goal at 26.8%.  He has remained off of cigarettes.  He will receive a Td booster today.  Flu shot was encouraged for the fall with a Prevnar vaccine at age 63.  Colonoscopy is due 2020.  Health maintenance habits are good    2.  Hypertension: Blood pressure control is excellent on current regimen    3.  Heart block: EKG again reveals right bundle branch block with left anterior hemiblock.  He does not have symptoms to suggest more advanced degrees of heart block.  Should be monitored with serial EKGs.  He was encouraged to seek further evaluation for syncope or near syncope in the future    4.  History of colonic polyps: Next colonoscopy is due 2020  PLAN:  1.  EKG was done and reviewed.  It showed sinus bradycardia right bundle branch block with left anterior hemiblock and borderline first-degree AV block.  2.  Td booster.  Flu shot in the fall.  Prevnar vaccine age 63  3.  Labs as outlined.  He will be notified of test results  4.  Abdominal aortic ultrasound was advised as a screen for aortic aneurysm since he is a previous smoker  5.  Work on exercise and weight loss with goal body fat under 25%  6.  Continue current medications with clinic follow-up in 1 year or as needed    Orders Placed This Encounter   Procedures     US Abdominal Aorta     Standing Status:   Future     Standing Expiration Date:   8/4/2018     Order Specific Question:   Can the procedure be changed per Radiologist protocol?     Answer:   Yes     Td, Adult, Adsorbed (blue label)     Comprehensive Metabolic Panel     Lipid Cascade     Order Specific Question:   Fasting is required?     Answer:   Unknown     HM2(CBC w/o Differential)     Urinalysis-UC if Indicated     PSA (Prostatic-Specific Antigen), Annual Screen     Electrocardiogram Perform and Read     Medications Discontinued During This Encounter   Medication Reason     losartan (COZAAR) 25  MG tablet Reorder           ASSESSED PROBLEMS:  1. Prostate cancer screening  PSA (Prostatic-Specific Antigen), Annual Screen   2. Urinary frequency  Urinalysis-UC if Indicated   3. Hypertension  Comprehensive Metabolic Panel    Lipid Cascade    losartan (COZAAR) 25 MG tablet   4. Medication monitoring encounter  Comprehensive Metabolic Panel    HM2(CBC w/o Differential)   5. Encounter for abdominal aortic aneurysm (AAA) screening  US Abdominal Aorta   6. Essential hypertension with goal blood pressure less than 140/90  losartan (COZAAR) 25 MG tablet   7. RBBB (right bundle branch block with left anterior fascicular block)  Electrocardiogram Perform and Read       CHIEF COMPLAINT:  No chief complaint on file.      HISTORY OF PRESENT ILLNESS:  Darion is a 62 y.o. male presenting to the clinic today for follow-up of hypertension and for general health evaluation.  Overall, Vinod feels well.  Is hoping to retire from his regular job in the near future.  He also works in the [employer] 1 week of the month at [employer].  He feels well with no major acute health concerns.  He has remained off of cigarettes    REVIEW OF SYSTEMS:   All other systems are negative.    PFSH:  .  Quit smoking several years ago.  In the [employer] as above.  History   Smoking Status     Former Smoker     Years: 3.00     Types: Cigarettes   Smokeless Tobacco     Not on file       Family History   Problem Relation Age of Onset     Cancer Mother      Cancer Father        Social History     Social History     Marital status:      Spouse name: N/A     Number of children: N/A     Years of education: N/A     Occupational History     Not on file.     Social History Main Topics     Smoking status: Former Smoker     Years: 3.00     Types: Cigarettes     Smokeless tobacco: Not on file     Alcohol use No     Drug use: No     Sexual activity: Not on file     Other Topics Concern     Not on file     Social  "History Narrative       Past Surgical History:   Procedure Laterality Date     NO PAST SURGERIES         Allergies   Allergen Reactions     Lisinopril      Annotation: cough         Active Ambulatory Problems     Diagnosis Date Noted     Benign Polyps Of The Large Intestine      Bifascicular bundle branch block      Hypertension      Arthritis 04/13/2016     Anal fissure 12/07/2016     Resolved Ambulatory Problems     Diagnosis Date Noted     Closed Fracture Of One Right Rib      Chest pressure 03/29/2016     Chest pain 03/29/2016     Viral gastritis      Headache      Myalgia      Past Medical History:   Diagnosis Date     Fracture, rib        VITALS:  Vitals:    08/04/17 0825   BP: 122/82   Pulse: 88   Resp: 10   Weight: 184 lb (83.5 kg)   Height: 5' 8\" (1.727 m)     Wt Readings from Last 3 Encounters:   08/04/17 184 lb (83.5 kg)   10/10/16 145 lb 1.6 oz (65.8 kg)   09/12/16 181 lb 9.6 oz (82.4 kg)       PHYSICAL EXAM:    Constitutional:   Reveals an alert, pleasant, talkative male with appropriate affect. Does not appear acutely ill. Affect appropriate.  Vitals: per nursing notes.  HEENT:  Ears:  External canals, TMs clear.    Eyes:  EOMs full, PERRL.  Oropharynx:   Mouth and throat clear, no thrush or exudate.  Neck:  Supple, no carotid bruits or adenopathy.  Back:  No spine or CVA pain.  Thorax:  No bony deformities.  Lungs: Clear to A&P without rales or wheezes.  Respiratory effort normal.  Cardiac:   Regular rate and rhythm, normal S1, S2, no murmur or gallop.  Breasts:   No masses, no axillary adenopathy.  Abdomen:  Soft, active bowel sounds without bruits, mass, or tenderness.  :  (Men)  No testicular masses, no penile lesions.    Rectal: No masses.   Prostate without nodules.  No inguinal hernias.  Extremities:   No peripheral edema, pulses in the feet intact.    Skin:  No jaundice, peripheral cyanosis or lesions to suggest malignancy.  Neuro:  Alert and oriented. Cranial nerves, motor, sensory exams are " intact.  No gross focal deficits.  Psychiatric:  Memory intact, mood appropriate.    QUALITY MEASURES:  The following high BMI interventions were performed this visit: encouragement to exercise, weight monitoring and prescribed dietary intake    ADDITIONAL HISTORY SUMMARIZED (2): None.  DECISION TO OBTAIN EXTRA INFORMATION (1): None.   RADIOLOGY TESTS (1): None.  LABS (1): 1  MEDICINE TESTS (1):  INDEPENDENT REVIEW (2 each): None.   EK  The visit lasted a total of 28 minutes face to face with the patient. Over 50% of the time was spent counseling and educating the patient about hypertension.      I, Dr. Veras, personally performed the services described in this documentation.    Dragon dictation was used for this note.  Speech recognition errors are a possibility.    MEDICATIONS:  Current Outpatient Prescriptions   Medication Sig Dispense Refill     ibuprofen (ADVIL,MOTRIN) 200 MG tablet Take 400 mg by mouth every 6 (six) hours as needed for pain.       Lactobacillus acidophilus (PROBIOTIC) 10 billion cell capsule Take by mouth.       losartan (COZAAR) 25 MG tablet TAKE 1 TABLET BY MOUTH DAILY 90 tablet 3     losartan (COZAAR) 25 MG tablet Take 1 tablet (25 mg total) by mouth daily. 90 tablet 3     No current facility-administered medications for this visit.        Total data points: 4

## 2021-06-13 NOTE — PROGRESS NOTES
"Darion Day is a 65 y.o. male who is being evaluated via a billable telephone visit.      The patient has been notified of following:     \"This telephone visit will be conducted via a call between you and your physician/provider. We have found that certain health care needs can be provided without the need for a physical exam.  This service lets us provide the care you need with a short phone conversation.  If a prescription is necessary we can send it directly to your pharmacy.  If lab work is needed we can place an order for that and you can then stop by our lab to have the test done at a later time.    Telephone visits are billed at different rates depending on your insurance coverage. During this emergency period, for some insurers they may be billed the same as an in-person visit.  Please reach out to your insurance provider with any questions.    If during the course of the call the physician/provider feels a telephone visit is not appropriate, you will not be charged for this service.\"    Patient has given verbal consent to a Telephone visit? Yes    What phone number would you like to be contacted at? [phone]    Patient would like to receive their AVS by AVS Preference: [place].    His friend tested positive and Darion was around him last week. They were around each other 1 week ago. 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     He also mentions that in February, he went to a trade show and some colleagues from [place] [employer] were there. A few days later, he got a fever which lasted for several days.     Assessment/Plan:  1. Exposure to COVID-19 virus: explained he should be tested. Ordered test and he will get a call about scheduling this. Explained it is certainly possible that he had COVID in February due to his exposure to [place] colleagues, but this is long enough ago that he likely wouldn't still have protective antibodies  - Asymptomatic COVID-19 Virus (CORONAVIRUS) " PCR; Future        Phone call duration:  8 minutes    Marlene Colon MD

## 2021-06-14 NOTE — PROGRESS NOTES
ASSESSMENT:  1.  Patchy dermatitis: He has a small erythematous patches with overlying scale.  This appears somewhat suggestive of guttate psoriasis.  Did not have any prodromal symptoms of illness prior to onset    PLAN:  1.  Zyrtec 10 mg twice daily as needed for itching  2.  Triamcinolone cream 0.1% twice daily to involved areas until resolved  3.  Call if not improving in 3-4 weeks.  A skin biopsy then would be advised      Medications Discontinued During This Encounter   Medication Reason     losartan (COZAAR) 25 MG tablet Duplicate order           ASSESSED PROBLEMS:  1. Psoriasis, guttate  triamcinolone (KENALOG) 0.1 % cream       CHIEF COMPLAINT:  Chief Complaint   Patient presents with     Rash     red spots on chest and back that itch but has no pain.       HISTORY OF PRESENT ILLNESS:  Darion is a 62 y.o. male presenting to the clinic today with complaints of a rash.     Rash: He noticed some itchiness on his left posterior shoulder about a week ago. He then saw that there was a red bump in the area that itched. After a few days, he noticed more red spots and itching. The rash has now spread to his chest and arms. It does not hurt, and he feels fine outside of the itching, but the itching can be bad enough that it wakes him up at night. Only some of the lesions itch. There are not any on his legs. He does not think he has a family history of psoriasis. He denies fever, cold symptoms, or sore throat.     REVIEW OF SYSTEMS:   He denies fever, cold symptoms, or sore throat. All other systems are negative.    PFSH:  He does not think he has a family history of psoriasis. He is still working for the [employer].     TOBACCO USE:  History   Smoking Status     Former Smoker     Years: 3.00     Types: Cigarettes   Smokeless Tobacco     Not on file       VITALS:  Vitals:    11/27/17 0938 11/27/17 0941   BP: (!) 136/102 (!) 138/98   Patient Site: Left Arm Left Arm   Patient Position: Sitting Sitting   Cuff Size: Adult  "Regular Adult Regular   Pulse: 72    Weight: 185 lb 6.4 oz (84.1 kg)    Height: 5' 8\" (1.727 m)      Wt Readings from Last 3 Encounters:   11/27/17 185 lb 6.4 oz (84.1 kg)   08/04/17 184 lb (83.5 kg)   10/10/16 145 lb 1.6 oz (65.8 kg)     Body mass index is 28.19 kg/(m^2).    PHYSICAL EXAM:  Constitutional:   Reveals an alert, pleasant, healthy appearing man. Affect appropriate. Vitals: per nursing notes.  Skin:  Patchy, erythematous eruption primarily involving the trunk, several small lesions on forearms, areas erythematous with small overlying scaling, no vesicles noted.   Neuro:  Alert and oriented. Cranial nerves, motor, sensory exams are intact.  No gross focal deficits.  Psychiatric:  Memory intact, mood appropriate.    ADDITIONAL HISTORY SUMMARIZED (2): None.  DECISION TO OBTAIN EXTRA INFORMATION (1): None.   RADIOLOGY TESTS (1): None.  LABS (1): None.  MEDICINE TESTS (1): None.  INDEPENDENT REVIEW (2 each): None.     The visit lasted a total of 9 minutes face to face with the patient. Over 50% of the time was spent counseling and educating the patient about his rash.    IAbiodun, am scribing for and in the presence of, Dr. Veras.    SULEMAN VAN, personally performed the services described in this documentation, as scribed by Abiodun Chaney in my presence, and it is both accurate and complete.    Dragon dictation was used for this note.  Speech recognition errors are a possibility.    MEDICATIONS:  Current Outpatient Prescriptions   Medication Sig Dispense Refill     ibuprofen (ADVIL,MOTRIN) 200 MG tablet Take 400 mg by mouth every 6 (six) hours as needed for pain.       Lactobacillus acidophilus (PROBIOTIC) 10 billion cell capsule Take by mouth.       losartan (COZAAR) 25 MG tablet TAKE 1 TABLET BY MOUTH DAILY 90 tablet 3     triamcinolone (KENALOG) 0.1 % cream Apply twice daily to involved area until resolved 80 g 1     No current facility-administered medications for this visit.        Total " data points: 0

## 2021-06-16 PROBLEM — K56.609 SBO (SMALL BOWEL OBSTRUCTION) (H): Status: ACTIVE | Noted: 2021-06-01

## 2021-06-16 PROBLEM — K63.5 POLYP OF COLON: Status: ACTIVE | Noted: 2020-04-06

## 2021-06-20 NOTE — LETTER
Letter by Josiah Veras MD at      Author: Josiah Veras MD Service: -- Author Type: --    Filed:  Encounter Date: 12/13/2019 Status: Signed         Darion Day  [address] [facility] [address]             December 13, 2019         Dear Mr. Day,    Below are the results from your recent visit:    Resulted Orders   Thyroid Stimulating Hormone (TSH)   Result Value Ref Range    TSH 1.30 0.30 - 5.00 uIU/mL   HM2(CBC w/o Differential)   Result Value Ref Range    WBC 5.5 4.0 - 11.0 thou/uL    RBC 4.94 4.40 - 6.20 mill/uL    Hemoglobin 16.2 14.0 - 18.0 g/dL    Hematocrit 47.9 40.0 - 54.0 %    MCV 97 80 - 100 fL    MCH 32.8 27.0 - 34.0 pg    MCHC 33.9 32.0 - 36.0 g/dL    RDW 10.9 (L) 11.0 - 14.5 %    Platelets 255 140 - 440 thou/uL    MPV 6.7 (L) 7.0 - 10.0 fL       Darion: The thyroid test and hemoglobin are in the normal range.    Please call with questions or contact us using [employer].    Sincerely,        Electronically signed by Josiah Veras MD

## 2021-06-20 NOTE — LETTER
Letter by Elton Olivo MD at      Author: Elton Olivo MD Service: -- Author Type: --    Filed:  Encounter Date: 9/18/2020 Status: (Other)         Darion Day  [address]  [facility] [address]      September 18, 2020      Dear Mr. Day,    Enclosed are some exercises that I would recommend to help in stretching / strengthening your ankle and to help with the pain you have been noticing. Though the heading suggests that they are specific to ankle laxity, they are also typically helpful for tendinitis occurring at the medial ankle. I would try to perform these most days of the week.  If you do not see improvement within 4-6 weeks, please let me know.  At that point, it would be worth considering visiting with a physical therapist.   If you have any questions or new concerns, please don't hesitate to let us know.    Sincerely,         Elton Olivo MD

## 2021-06-21 NOTE — PROGRESS NOTES
ASSESSMENT:  1.  Essential hypertension: Blood pressure has been slightly higher than optimal on multiple home checks.  Losartan dose will be increased.    2.  History of right bundle branch block with left anterior hemiblock: He has had no issues with syncope or near syncope.  EKG was checked again today and revealed sinus bradycardia with continued right bundle branch block and left anterior hemiblock.  EKGs will be periodically checked in the future.  He was advised to monitor closely for symptoms of syncope or near syncope    3.  History of colonic polyps: Next colonoscopy is due 2020    4.  Prostate cancer screening: PSA will be checked    5.  Health maintenance: He has had an influenza vaccine.  Prevnar vaccine will be given today.  Pneumo--23 is due next year.  Shingrix in the future.    PLAN:  1.  EKG was done and reviewed.  It revealed sinus bradycardia with right bundle branch block and left anterior hemiblock  2.  Prevnar vaccine today.  Pneumo--23 next year  3.  Increase losartan to 50 mg daily.  Monitor blood pressure with clinic follow-up in 3 months for blood pressure recheck  4.  Labs as outlined.  He will be notified of test results  5.  Next colonoscopy due 2020      There are no discontinued medications.        ASSESSED PROBLEMS:  No diagnosis found.    CHIEF COMPLAINT:  No chief complaint on file.      HISTORY OF PRESENT ILLNESS:  Darion is a 63 y.o. male presenting to the clinic today for medication check. He occasionally has his blood pressure checked at the [employer] station. He states that it generally runs around 130/80. He denies any fainting spells. He notes that his diastolic BP runs around 75-90.    Health Maintenance: He is up to date on colonoscopy; last was 2015 .  He does have a history of colonic polyps.  5-year follow-up was recommended at that time. He has already received his flu shot this season. He could like a pnuemonia shot today,     REVIEW OF SYSTEMS:   He denies any rash. The  rash he was seen for at his last visit has resolved. Positive for hemorrhoids. All other systems are negative.    PFSH:  Social: Former smoker.     He will be retireing this summer, he will still work for the [employer] in [employer], and on [employer] and [employer]    TOBACCO USE:  Social History     Tobacco Use   Smoking Status Former Smoker     Years: 3.00     Types: Cigarettes       VITALS:  There were no vitals filed for this visit.  Wt Readings from Last 3 Encounters:   11/27/17 185 lb 6.4 oz (84.1 kg)   08/04/17 184 lb (83.5 kg)   10/10/16 145 lb 1.6 oz (65.8 kg)     There is no height or weight on file to calculate BMI.      MEDICATIONS:  Current Outpatient Medications   Medication Sig Dispense Refill     ibuprofen (ADVIL,MOTRIN) 200 MG tablet Take 400 mg by mouth every 6 (six) hours as needed for pain.       Lactobacillus acidophilus (PROBIOTIC) 10 billion cell capsule Take by mouth.       losartan (COZAAR) 25 MG tablet TAKE 1 TABLET BY MOUTH DAILY 90 tablet 3     triamcinolone (KENALOG) 0.1 % cream Apply twice daily to involved area until resolved 80 g 1     No current facility-administered medications for this visit.        PHYSICAL EXAM:  Constitutional:   Reveals a alert pleasant male.  Affect appropriate. Does not seem acutely ill. Vitals: per nursing notes. MD recheck BP was 156/98.  HEENT:  Ears:  External canals, TMs clear.    Eyes:  EOMs full, PERRL.  Oropharynx:   Mouth and throat clear, no thrush or exudate.  Neck:  Supple, no carotid bruits or adenopathy.  Back:  No spine or CVA pain.  Thorax:  No bony deformities.  Lungs: Clear to A&P without rales or wheezes.  Respiratory effort normal.  Cardiac:   Regular rate and rhythm, normal S1, S2, no murmur.  Abdomen:  Soft, active bowel sounds without bruits, mass, or tenderness.  Extremities:   No peripheral edema, pulses in the feet intact.    Skin:  No lesions to suggest malignancy.  Neuro:  Alert and oriented. Cranial  nerves, motor, sensory exams are intact.  No gross focal deficits. Detailed exam not done.       ADDITIONAL HISTORY SUMMARIZED (2): None.  DECISION TO OBTAIN EXTRA INFORMATION (1): None.   RADIOLOGY TESTS (1): None.  LABS (1): Ordered labs today.   MEDICINE TESTS (1): Ordered EKG today.  INDEPENDENT REVIEW (2 each): Independent review of EKG, sinus bradycardia with right bundle branch block and left anterior hemiblock    The visit lasted a total of 22 minutes face to face with the patient. Over 50% of the time was spent counseling and educating the patient about medication check.    I, Sada Crad, am scribing for and in the presence of, Dr. Veras.    I, Dr. Josiah Veras, personally performed the services described in this documentation, as scribed by Sada Card in my presence, and it is both accurate and complete.    Total data points: 4

## 2021-09-12 ENCOUNTER — HEALTH MAINTENANCE LETTER (OUTPATIENT)
Age: 66
End: 2021-09-12

## 2021-09-28 DIAGNOSIS — I10 ESSENTIAL HYPERTENSION, BENIGN: Primary | ICD-10-CM

## 2021-09-28 NOTE — TELEPHONE ENCOUNTER
Reason for Call:  Medication or medication refill:    Do you use a [facility]?  Name of the pharmacy and phone number for the current request:  [facility] [address]-updated pharm    Name of the medication requested:   losartan (COZAAR) 50 MG tablet TAKE 1 AND 1/2 TABLET BY MOUTH DAILY  135 tablet 3         Other request: pharm has been trying to reach us    Can we leave a detailed message on this number? YES    Phone number patient can be reached at: Cell number on file:    Telephone Information:   Mobile [phone]       Best Time: any    Call taken on 9/28/2021 at 3:10 PM by Pam J. Behr

## 2021-09-29 RX ORDER — LOSARTAN POTASSIUM 50 MG/1
75 TABLET ORAL DAILY
COMMUNITY
End: 2021-09-29

## 2021-09-29 RX ORDER — LOSARTAN POTASSIUM 50 MG/1
75 TABLET ORAL DAILY
Qty: 135 TABLET | Refills: 3 | Status: SHIPPED | OUTPATIENT
Start: 2021-09-29 | End: 2022-09-29

## 2021-10-05 ENCOUNTER — PATIENT OUTREACH (OUTPATIENT)
Dept: CARE COORDINATION | Facility: CLINIC | Age: 66
End: 2021-10-05

## 2021-10-05 ENCOUNTER — TELEPHONE (OUTPATIENT)
Dept: INTERNAL MEDICINE | Facility: CLINIC | Age: 66
End: 2021-10-05

## 2021-10-05 ENCOUNTER — VIRTUAL VISIT (OUTPATIENT)
Dept: INTERNAL MEDICINE | Facility: CLINIC | Age: 66
End: 2021-10-05
Payer: MEDICARE

## 2021-10-05 DIAGNOSIS — I10 ESSENTIAL HYPERTENSION, BENIGN: ICD-10-CM

## 2021-10-05 DIAGNOSIS — K56.609 SBO (SMALL BOWEL OBSTRUCTION) (H): Primary | ICD-10-CM

## 2021-10-05 DIAGNOSIS — D12.4 BENIGN NEOPLASM OF DESCENDING COLON: ICD-10-CM

## 2021-10-05 PROCEDURE — 99214 OFFICE O/P EST MOD 30 MIN: CPT | Mod: TEL | Performed by: INTERNAL MEDICINE

## 2021-10-05 RX ORDER — POLYETHYLENE GLYCOL 3350 17 G/17G
17 POWDER, FOR SOLUTION ORAL 2 TIMES DAILY
COMMUNITY

## 2021-10-05 NOTE — PROGRESS NOTES
"Darion is a 66 year old male being evaluated via a billable phone visit, and would like to be contacted via the following  Home number on file [phone] (home)    After phone disconnected.  Discussed adjustment of MiraLAX.  Discussed colonoscopy reviewed.  He has an appointment to see surgery next week     ASSESSMENT:  DX: 1. SBO (small bowel obstruction) (H)  Recurrent after initial episode in June.  Treated conservatively at that time but now spontaneously remitted.  Suspect adhesion, internal hernia, or possible malignancy.  Last colonoscopy April 2020 with polyps.  Recommend outpatient surgical consultation, and consideration of further testing versus exploratory laparotomy  - Adult General Surg Referral; Future    2. Essential hypertension, benign  Stable    3.  Colon polyps.  Colonoscopy up-to-date       Preventive Care Assessed: Otherwise up-to-date    PLAN:  Patient Instructions   Surgical referral to Dr. Larry Tompikns    Continue MiraLAX    Low fiber diet     Return in about 4 months (around 2/5/2022) for using a phone visit.    CHIEF COMPLAINT:  Chief Complaint   Patient presents with     Bowel Problems     constipation - ? obstruction - everything let loose this AM       HISTORY OF PRESENT ILLNESS:  Darion is a 66 year old male contacting the clinic today via phone for complaints of bowel obstruction.  He was admitted to the hospital in June with abdominal pain.  CT scan showed small bowel obstruction.  He was treated conservatively with resolution.  Surgical consultation indicated that sometimes \"this just happens\".  He does not recall ever having surgery.  He has no hernias    3 days ago symptoms began to return.  He developed slowly progressive pain and bloating.  He called and made the appointment but at 11:00 this morning things opened up and he had a large bowel movement with resolution of symptoms.  He would like this resolved    REVIEW OF SYSTEMS:  Stable blood pressure    PFSH:  Social History " "    Social History Narrative     Not on file     Wife is nurse    TOBACCO USE:  History   Smoking Status     Former Smoker     Years: 3.00     Types: Cigarettes   Smokeless Tobacco     Never Used       VITALS:  There were no vitals filed for this visit.  There were no vitals taken for this visit. Estimated body mass index is 24.67 kg/m  as calculated from the following:    Height as of 6/1/21: 1.778 m (5' 10\").    Weight as of 6/1/21: 78 kg (171 lb 14.4 oz).    PHYSICAL EXAM:  (observations via Phone)  Alert and oriented    MEDICATIONS  Current Outpatient Medications   Medication Sig Dispense Refill     losartan (COZAAR) 50 MG tablet Take 1.5 tablets (75 mg) by mouth daily 135 tablet 3     polyethylene glycol (MIRALAX) 17 GM/Dose powder Take 17 g by mouth         Notes summarized: Surgical consultation from June  Labs, x-rays, cardiology, GI tests reviewed: CT and labs from June showing bowel obstruction and leukocytosis  Recent Labs   Lab Test 06/02/21  0604 06/01/21  0507 12/13/19  0921 12/13/19  0921   HGB 14.0 17.0   < > 16.2    140  --   --    POTASSIUM 4.2 3.6  --   --    CR 0.89 0.86  --   --    TSH  --   --   --  1.30    < > = values in this interval not displayed.     New orders:   Orders Placed This Encounter   Procedures     Adult General Surg Referral       Independent review of:  Supplemental history by:      Patient would like to receive their AVS by Celso Queen MD       [facility]    Phone Start Time: 2:10 PM  Phone End time:  2:31 PM  Conversation plus orders: 21 minutes  Dictation time:  3 minutes    The visit lasted a total of 22 minutes   "

## 2021-10-05 NOTE — PROGRESS NOTES
Clinic Care Coordination Contact  [facility]/Voicemail       Clinical Data: Care Coordinator Outreach  Outreach attempted x 1.  Left message on patient's voicemail with call back information and requested return call.  Care Coordinator will try to reach patient again in 1-2 business days.  10/7/21

## 2021-10-08 ENCOUNTER — PATIENT OUTREACH (OUTPATIENT)
Dept: NURSING | Facility: CLINIC | Age: 66
End: 2021-10-08

## 2021-10-08 NOTE — PROGRESS NOTES
Clinic Care Coordination Contact  Community Health Worker Initial Outreach            Patient accepts CC: No, Declined CC services, patient states he has already scheduled his surgery appt for 10/13/21 @ 2pm. Patient will be sent Care Coordination introduction letter for future reference.

## 2021-10-13 ENCOUNTER — TRANSFERRED RECORDS (OUTPATIENT)
Dept: HEALTH INFORMATION MANAGEMENT | Facility: CLINIC | Age: 66
End: 2021-10-13

## 2021-10-20 ENCOUNTER — HOSPITAL ENCOUNTER (OUTPATIENT)
Dept: CT IMAGING | Facility: HOSPITAL | Age: 66
Discharge: HOME | End: 2021-10-20
Attending: SURGERY | Admitting: SURGERY
Payer: MEDICARE

## 2021-10-20 DIAGNOSIS — Z87.19 HISTORY OF SMALL BOWEL OBSTRUCTION: ICD-10-CM

## 2021-10-20 LAB
CREAT BLD-MCNC: 0.9 MG/DL (ref 0.7–1.3)
GFR SERPL CREATININE-BSD FRML MDRD: >60 ML/MIN/1.73M2

## 2021-10-20 PROCEDURE — 250N000011 HC RX IP 250 OP 636: Performed by: SURGERY

## 2021-10-20 PROCEDURE — 74177 CT ABD & PELVIS W/CONTRAST: CPT

## 2021-10-20 PROCEDURE — 82565 ASSAY OF CREATININE: CPT

## 2021-10-20 RX ORDER — IOPAMIDOL 755 MG/ML
100 INJECTION, SOLUTION INTRAVASCULAR ONCE
Status: COMPLETED | OUTPATIENT
Start: 2021-10-20 | End: 2021-10-20

## 2021-10-20 RX ADMIN — IOPAMIDOL 100 ML: 755 INJECTION, SOLUTION INTRAVENOUS at 07:33

## 2021-11-10 ENCOUNTER — TRANSFERRED RECORDS (OUTPATIENT)
Dept: HEALTH INFORMATION MANAGEMENT | Facility: CLINIC | Age: 66
End: 2021-11-10
Payer: MEDICARE

## 2021-11-29 ENCOUNTER — OFFICE VISIT (OUTPATIENT)
Dept: INTERNAL MEDICINE | Facility: CLINIC | Age: 66
End: 2021-11-29
Payer: MEDICARE

## 2021-11-29 ENCOUNTER — ANCILLARY PROCEDURE (OUTPATIENT)
Dept: GENERAL RADIOLOGY | Facility: CLINIC | Age: 66
End: 2021-11-29
Attending: INTERNAL MEDICINE
Payer: MEDICARE

## 2021-11-29 VITALS
BODY MASS INDEX: 25.06 KG/M2 | OXYGEN SATURATION: 98 % | HEART RATE: 84 BPM | HEIGHT: 70 IN | DIASTOLIC BLOOD PRESSURE: 70 MMHG | WEIGHT: 175.06 LBS | SYSTOLIC BLOOD PRESSURE: 112 MMHG | RESPIRATION RATE: 18 BRPM

## 2021-11-29 DIAGNOSIS — I10 ESSENTIAL HYPERTENSION: ICD-10-CM

## 2021-11-29 DIAGNOSIS — K56.609 SBO (SMALL BOWEL OBSTRUCTION) (H): ICD-10-CM

## 2021-11-29 DIAGNOSIS — M25.532 LEFT WRIST PAIN: ICD-10-CM

## 2021-11-29 DIAGNOSIS — M25.532 LEFT WRIST PAIN: Primary | ICD-10-CM

## 2021-11-29 PROCEDURE — 99214 OFFICE O/P EST MOD 30 MIN: CPT | Performed by: INTERNAL MEDICINE

## 2021-11-29 PROCEDURE — 73130 X-RAY EXAM OF HAND: CPT | Mod: TC | Performed by: RADIOLOGY

## 2021-11-29 PROCEDURE — 73100 X-RAY EXAM OF WRIST: CPT | Mod: TC | Performed by: RADIOLOGY

## 2021-11-29 ASSESSMENT — MIFFLIN-ST. JEOR: SCORE: 1570.33

## 2021-11-30 NOTE — PROGRESS NOTES
ASSESSMENT:  1.  Left wrist pain: Darion noted the acute onset  Of left wrist pain after he fell off a ladder landing on the left hand.  He feels that he may have forcefully palmar flex the hand during the fall.  He has had persistent pain on the dorsal aspect of the wrist with some swelling.  He is left-handed.  An x-ray will be done for further evaluation and to make sure he does not have a fracture.    2   small bowel obstruction: He was hospitalized for this on 6/1/2021.  Symptoms resolved with conservative management.  He has recurrent symptoms in early October.  He was referred to Dr. Wood after a virtual visit with Dr. Queen.  CT enterography was done which raised the possibility of a left paraduodenal internal hemorrhoid hernia and sigmoid mesenteric internal hernia.  Darion has been using MiraLAX twice daily.  He has had no recurrence of symptoms.  He would prefer to avoid surgical intervention if possible.    3.  Essential hypertension:  Good control on losartan 50 mg daily    PLAN:  Patient Instructions   1.  Rest wrist.  Anticipate  gradual improvement radiology report suggested a possible scapholunate ligament tear.  An MRI would be advised if discomfort persists    2.  Annual wellness visit in the future.    3.  See Dr. Fry if symptoms of small bowel obstruction recur    Orders Placed This Encounter   Procedures     XR Wrist Left 2 Views     XR Hand Left G/E 3 Views     There are no discontinued medications.    Return in about 3 months (around 2/28/2022) for Routine preventive.    ASSESSED PROBLEMS:  See above    CHIEF COMPLAINT:  Left wrist pain    HISTORY OF PRESENT ILLNESS:  Darion is a 66 year old male who presents complaining of left wrist pain.  He states that he slipped off a ladder about 1 month ago landing on the left hand.  He feels that he may have dorsiflexed the wrist during the fall.  He has noted persistent pain and swelling on the dorsal aspect of the left wrist.  He is  "left-handed.  He has chronic issues with pain at the base of the right thumb.    He was hospitalized with small bowel obstruction on June 1.  Symptoms resolved with conservative management.  Similar but milder symptoms in early October.  He had a virtual visit with Dr. Queen and subsequent consult with Dr. Fry.  CT enterography suggested the possibility of internal hernia.  He has done well since then with no recurrence of obstructive symptoms.  He is using MiraLAX twice daily.    Remains on losartan 50 mg daily for hypertension    REVIEW OF SYSTEMS:    Comprehensive review of systems is otherwise negative.    PFSH:   and retired.  Previously did extensive work with his hands    TOBACCO USE:  History   Smoking Status     Former Smoker     Years: 3.00     Types: Cigarettes   Smokeless Tobacco     Never Used       VITALS:  Vitals:    11/29/21 1247   BP: 112/70   BP Location: Left arm   Patient Position: Sitting   Cuff Size: Adult Regular   Pulse: 84   Resp: 18   SpO2: 98%   Weight: 79.4 kg (175 lb 1 oz)   Height: 1.778 m (5' 10\")     Wt Readings from Last 3 Encounters:   11/29/21 79.4 kg (175 lb 1 oz)   09/18/20 81.6 kg (180 lb)   03/04/20 79 kg (174 lb 4 oz)       PHYSICAL EXAM:  Constitutional:   Reveals an alert pleasant talkative man with appropriate affect.  He does not seem in acute distress.   Vitals: per nursing notes.  HEENT: Atraumatic  Eyes: No conjunctival hyperemia  Neck:  Supple, no carotid bruits or adenopathy.  Back:  No spine or CVA pain.  Thorax:  No bony deformities.  Lungs: Clear to A&P without rales or wheezes.  Respiratory effort normal.  Cardiac:   Regular rate and rhythm, normal S1, S2, no murmur or gallop.  .Abdomen:  Soft, active bowel sounds without bruits, mass, or tenderness..  Extremities: Soft tissue swelling over the midportion of the left wrist on the dorsal aspect.  Joint changes suggest moderate osteoarthritis  Skin:  No jaundice, peripheral cyanosis or lesions to " suggest malignancy.  Neuro:  Alert and oriented.  No gross focal deficits.  Psychiatric:  Memory intact, mood appropriate.      INDICATION:  Left wrist pain  COMPARISON: None.                                                                      IMPRESSION:   Wrist: No acute fracture. Mild widening of the scapholunate interval and DISI deformity suggesting scapholunate ligament tear. 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 Milder degenerative arthritis of the   radioscaphoid and thumb CMC joints. Soft tissue swelling about the wrist.     Hand: No acute fracture. Minimal degenerative arthritis of the third MCP joint.      DATA REVIEWED:  Additional History from Old Records Summarized (2): None.  Decision to Obtain Records (1): None.   Radiology Tests Summarized or Ordered (1): None.  Labs Reviewed or Ordered (1): None.  Medicine Test Summarized or Ordered (1): Abdominal CTA reviewed  Independent Review of EKG or X-RAY(2 each): Wrist x-ray done and reviewed    The visit lasted a total of 25 minutes face to face with the patient. Over 50% of the time was spent counseling and educating the patient about evaluation of wrist pain.    Dragon dictation was used for this note. Speech recognition errors are a possibility.     MEDICATIONS:  Current Outpatient Medications   Medication Sig Dispense Refill     losartan (COZAAR) 50 MG tablet Take 1.5 tablets (75 mg) by mouth daily 135 tablet 3     polyethylene glycol (MIRALAX) 17 GM/Dose powder Take 17 g by mouth 2 times daily

## 2021-12-02 DIAGNOSIS — M25.532 LEFT WRIST PAIN: Primary | ICD-10-CM

## 2021-12-16 ENCOUNTER — HOSPITAL ENCOUNTER (OUTPATIENT)
Dept: MRI IMAGING | Facility: HOSPITAL | Age: 66
Discharge: HOME | End: 2021-12-16
Attending: INTERNAL MEDICINE | Admitting: INTERNAL MEDICINE
Payer: MEDICARE

## 2021-12-16 DIAGNOSIS — M25.532 LEFT WRIST PAIN: ICD-10-CM

## 2021-12-16 PROCEDURE — 73221 MRI JOINT UPR EXTREM W/O DYE: CPT | Mod: LT

## 2021-12-17 DIAGNOSIS — S63.8X2D TEAR OF LEFT SCAPHOLUNATE LIGAMENT, SUBSEQUENT ENCOUNTER: Primary | ICD-10-CM

## 2021-12-27 ENCOUNTER — TELEPHONE (OUTPATIENT)
Dept: INTERNAL MEDICINE | Facility: CLINIC | Age: 66
End: 2021-12-27

## 2021-12-27 ENCOUNTER — TELEPHONE (OUTPATIENT)
Dept: INTERNAL MEDICINE | Facility: CLINIC | Age: 66
End: 2021-12-27
Payer: MEDICARE

## 2021-12-27 NOTE — TELEPHONE ENCOUNTER
Writer called the Orthopedic  Referral team.  Representative states he can see the referral.  It is unclear why patient has not been contacted yet.  Representative advised patient should go to [facility].    Darion was contacted.  He was given the phone number for [facility] and the Orthopedic [facility] Referral Team should he have any problem scheduling an appointment with [facility].

## 2021-12-27 NOTE — TELEPHONE ENCOUNTER
Reason for Call:  Other call back    Detailed comments: pt needs to know which orthopedic offic eto go to     Phone Number Patient can be reached at: Cell number on file:    Telephone Information:   Mobile [phone]       Best Time: please call patient today    Can we leave a detailed message on this number? YES    Call taken on 12/27/2021 at 8:32 AM by Yana Sheridan

## 2021-12-29 ENCOUNTER — TRANSFERRED RECORDS (OUTPATIENT)
Dept: HEALTH INFORMATION MANAGEMENT | Facility: CLINIC | Age: 66
End: 2021-12-29
Payer: MEDICARE

## 2022-06-06 ENCOUNTER — TELEPHONE (OUTPATIENT)
Dept: INTERNAL MEDICINE | Facility: CLINIC | Age: 67
End: 2022-06-06

## 2022-06-06 NOTE — TELEPHONE ENCOUNTER
Symptoms do not sound particularly worrisome for leukemia.  If no further problems, he may wait till the visit in July, otherwise I could see him earlier.

## 2022-06-06 NOTE — TELEPHONE ENCOUNTER
Reason for Call:  Other call back    Detailed comments: pt had last evening a couple episodes of where the water tasted metallic and then also had bloody nose, no dizziness, no nausea, no vomitting, no diarrhea    Patient is concerned - his father had Leukemia    Please advise    Phone Number Patient can be reached at: Home number on file [phone] (home)    Best Time: anytime    Can we leave a detailed message on this number? YES    Call taken on 6/6/2022 at 8:22 AM by Yana Sheridan

## 2022-06-06 NOTE — TELEPHONE ENCOUNTER
Spoke with patient to clarify symptoms. Patient states this happened last night and the bloody nose was able to be stopped with slight pressure in about 2 minutes. Denies any other symptoms, recent illnesses or injury. Denies any previous episodes of epistaxis. Denies any other bleeding concerns. Appointment scheduled for 7/11 for AWV per patient request.    Woo Oconnell RN  [facility]

## 2022-06-19 ENCOUNTER — HEALTH MAINTENANCE LETTER (OUTPATIENT)
Age: 67
End: 2022-06-19

## 2022-07-11 ENCOUNTER — OFFICE VISIT (OUTPATIENT)
Dept: INTERNAL MEDICINE | Facility: CLINIC | Age: 67
End: 2022-07-11
Payer: MEDICARE

## 2022-07-11 VITALS
WEIGHT: 170.7 LBS | RESPIRATION RATE: 14 BRPM | HEART RATE: 64 BPM | OXYGEN SATURATION: 98 % | SYSTOLIC BLOOD PRESSURE: 124 MMHG | DIASTOLIC BLOOD PRESSURE: 80 MMHG | BODY MASS INDEX: 24.49 KG/M2

## 2022-07-11 DIAGNOSIS — D12.6 BENIGN NEOPLASM OF COLON, UNSPECIFIED PART OF COLON: ICD-10-CM

## 2022-07-11 DIAGNOSIS — I45.2 RIGHT BUNDLE BRANCH BLOCK (RBBB) WITH LEFT ANTERIOR HEMIBLOCK: ICD-10-CM

## 2022-07-11 DIAGNOSIS — Z12.5 SCREENING FOR PROSTATE CANCER: ICD-10-CM

## 2022-07-11 DIAGNOSIS — I10 ESSENTIAL HYPERTENSION: ICD-10-CM

## 2022-07-11 DIAGNOSIS — Z00.00 MEDICARE ANNUAL WELLNESS VISIT, SUBSEQUENT: Primary | ICD-10-CM

## 2022-07-11 LAB
ALBUMIN SERPL BCG-MCNC: 4.6 G/DL (ref 3.5–5.2)
ALBUMIN UR-MCNC: NEGATIVE MG/DL
ALP SERPL-CCNC: 55 U/L (ref 40–129)
ALT SERPL W P-5'-P-CCNC: 21 U/L (ref 10–50)
ANION GAP SERPL CALCULATED.3IONS-SCNC: 10 MMOL/L (ref 7–15)
APPEARANCE UR: CLEAR
AST SERPL W P-5'-P-CCNC: 25 U/L (ref 10–50)
ATRIAL RATE - MUSE: 56 BPM
BACTERIA #/AREA URNS HPF: ABNORMAL /HPF
BILIRUB SERPL-MCNC: 0.7 MG/DL
BILIRUB UR QL STRIP: NEGATIVE
BUN SERPL-MCNC: 11.1 MG/DL (ref 8–23)
CALCIUM SERPL-MCNC: 9.7 MG/DL (ref 8.8–10.2)
CHLORIDE SERPL-SCNC: 103 MMOL/L (ref 98–107)
CHOLEST SERPL-MCNC: 205 MG/DL
COLOR UR AUTO: YELLOW
CREAT SERPL-MCNC: 1 MG/DL (ref 0.67–1.17)
DEPRECATED HCO3 PLAS-SCNC: 27 MMOL/L (ref 22–29)
DIASTOLIC BLOOD PRESSURE - MUSE: NORMAL MMHG
ERYTHROCYTE [DISTWIDTH] IN BLOOD BY AUTOMATED COUNT: 13.3 % (ref 10–15)
GFR SERPL CREATININE-BSD FRML MDRD: 81 ML/MIN/1.73M2
GLUCOSE SERPL-MCNC: 96 MG/DL (ref 70–99)
GLUCOSE UR STRIP-MCNC: NEGATIVE MG/DL
HCT VFR BLD AUTO: 49.9 % (ref 40–53)
HDLC SERPL-MCNC: 66 MG/DL
HGB BLD-MCNC: 16.5 G/DL (ref 13.3–17.7)
HGB UR QL STRIP: ABNORMAL
INTERPRETATION ECG - MUSE: NORMAL
KETONES UR STRIP-MCNC: NEGATIVE MG/DL
LDLC SERPL CALC-MCNC: 123 MG/DL
LEUKOCYTE ESTERASE UR QL STRIP: NEGATIVE
MCH RBC QN AUTO: 30.8 PG (ref 26.5–33)
MCHC RBC AUTO-ENTMCNC: 33.1 G/DL (ref 31.5–36.5)
MCV RBC AUTO: 93 FL (ref 78–100)
NITRATE UR QL: NEGATIVE
NONHDLC SERPL-MCNC: 139 MG/DL
P AXIS - MUSE: 36 DEGREES
PH UR STRIP: 7 [PH] (ref 5–8)
PLATELET # BLD AUTO: 235 10E3/UL (ref 150–450)
POTASSIUM SERPL-SCNC: 4.1 MMOL/L (ref 3.4–5.3)
PR INTERVAL - MUSE: 178 MS
PROT SERPL-MCNC: 7.3 G/DL (ref 6.4–8.3)
PSA SERPL-MCNC: 0.72 NG/ML (ref 0–4.5)
QRS DURATION - MUSE: 140 MS
QT - MUSE: 446 MS
QTC - MUSE: 430 MS
R AXIS - MUSE: -56 DEGREES
RBC # BLD AUTO: 5.35 10E6/UL (ref 4.4–5.9)
RBC #/AREA URNS AUTO: ABNORMAL /HPF
SODIUM SERPL-SCNC: 140 MMOL/L (ref 136–145)
SP GR UR STRIP: 1.01 (ref 1–1.03)
SQUAMOUS #/AREA URNS AUTO: ABNORMAL /LPF
SYSTOLIC BLOOD PRESSURE - MUSE: NORMAL MMHG
T AXIS - MUSE: 25 DEGREES
TRIGL SERPL-MCNC: 82 MG/DL
UROBILINOGEN UR STRIP-ACNC: 0.2 E.U./DL
VENTRICULAR RATE- MUSE: 56 BPM
WBC # BLD AUTO: 5.4 10E3/UL (ref 4–11)
WBC #/AREA URNS AUTO: ABNORMAL /HPF

## 2022-07-11 PROCEDURE — 85027 COMPLETE CBC AUTOMATED: CPT | Performed by: INTERNAL MEDICINE

## 2022-07-11 PROCEDURE — 36415 COLL VENOUS BLD VENIPUNCTURE: CPT | Performed by: INTERNAL MEDICINE

## 2022-07-11 PROCEDURE — 93010 ELECTROCARDIOGRAM REPORT: CPT | Mod: OFF | Performed by: GENERAL ACUTE CARE HOSPITAL

## 2022-07-11 PROCEDURE — 80053 COMPREHEN METABOLIC PANEL: CPT | Performed by: INTERNAL MEDICINE

## 2022-07-11 PROCEDURE — G0103 PSA SCREENING: HCPCS | Performed by: INTERNAL MEDICINE

## 2022-07-11 PROCEDURE — G0439 PPPS, SUBSEQ VISIT: HCPCS | Performed by: INTERNAL MEDICINE

## 2022-07-11 PROCEDURE — 93005 ELECTROCARDIOGRAM TRACING: CPT | Performed by: INTERNAL MEDICINE

## 2022-07-11 PROCEDURE — 80061 LIPID PANEL: CPT | Performed by: INTERNAL MEDICINE

## 2022-07-11 PROCEDURE — 81001 URINALYSIS AUTO W/SCOPE: CPT | Performed by: INTERNAL MEDICINE

## 2022-07-11 ASSESSMENT — ENCOUNTER SYMPTOMS
HEMATURIA: 0
CHILLS: 0
NAUSEA: 0
NERVOUS/ANXIOUS: 0
WEAKNESS: 0
ARTHRALGIAS: 1
SORE THROAT: 0
FREQUENCY: 0
HEARTBURN: 0
SHORTNESS OF BREATH: 0
HEADACHES: 0
HEMATOCHEZIA: 0
DYSURIA: 0
DIZZINESS: 0
DIARRHEA: 0
PARESTHESIAS: 0
FEVER: 0
EYE PAIN: 0
ABDOMINAL PAIN: 0
JOINT SWELLING: 0
CONSTIPATION: 0
PALPITATIONS: 0
MYALGIAS: 0
COUGH: 0

## 2022-07-11 ASSESSMENT — PAIN SCALES - GENERAL: PAINLEVEL: NO PAIN (0)

## 2022-07-11 ASSESSMENT — ACTIVITIES OF DAILY LIVING (ADL): CURRENT_FUNCTION: NO ASSISTANCE NEEDED

## 2022-07-11 NOTE — PATIENT INSTRUCTIONS
Recheck EKG    2.  I will notify of test results    3.  Consider CT scan for colon cancer screening    4.  Check insurance for Shingrix vaccine.  This would be advised.    5.  See in one year or as needed.

## 2022-07-11 NOTE — PROGRESS NOTES
"SUBJECTIVE:   Darion Day is a 67 year old male who presents for Preventive Visit.  Vinod is  and retired.  He does note problems with pain in his hands and wrists which limits physical activity.  No cognitive deficits are noted.  His health risk assessment was reviewed.  Overall, health maintenance habits are good.  He does have a healthcare directive.    Has a history of essential hypertension treated with losartan.  Blood pressure has been good when checked.  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  He has not had any issues with syncope or near syncope.    Has a history of colonic polyps with last colonoscopy in 4/20.  3-year follow-up was advised at that time.  He also has ongoing issues with constipation which was discussed    He is a former smoker.  He has been off of cigarettes since 2014.  The potential benefits of doing a CT scan to screen for lung cancer was discussed.  He wishes to think about this    Patient has been advised of split billing requirements and indicates understanding: Yes  Are you in the first 12 months of your Medicare coverage?  No    Healthy Habits:     In general, how would you rate your overall health?  Good    Frequency of exercise:  4-5 days/week    Duration of exercise:  30-45 minutes    Do you usually eat at least 4 servings of fruit and vegetables a day, include whole grains    & fiber and avoid regularly eating high fat or \"junk\" foods?  No    Taking medications regularly:  Yes    Medication side effects:  None    Ability to successfully perform activities of daily living:  No assistance needed    Home Safety:  No safety concerns identified    Hearing Impairment:  No hearing concerns    In the past 6 months, have you been bothered by leaking of urine?  No    In general, how would you rate your overall mental or emotional health?  Good      PHQ-2 Total Score: 0    Additional " concerns today:  No    Do you feel safe in your environment? Yes    Have you ever done Advance Care Planning? (For example, a Health Directive, POLST, or a discussion with a medical provider or your loved ones about your wishes): Yes, patient states has an Advance Care Planning document and will bring a copy to the clinic.       Fall risk  Fallen 2 or more times in the past year?: No  Any fall with injury in the past year?: No    Cognitive Screening   1) Repeat 3 items (Leader, Season, Table)    2) Clock draw: NORMAL  3) 3 item recall: Recalls 2 objects   Results: 3 items recalled: COGNITIVE IMPAIRMENT LESS LIKELY    Mini-CogTM Copyright [employer]. Licensed by the author for use in [facility]; reprinted with permission (soob@[facility]). All rights reserved.      Do you have sleep apnea, excessive snoring or daytime drowsiness?: no    Reviewed and updated as needed this visit by clinical staff   Tobacco   Meds   Med Hx  Surg Hx  Fam Hx  Soc Hx          Reviewed and updated as needed this visit by Provider                   Social History    and retired.  Tobacco Use     Smoking status: Former Smoker.  Quit 2014     Years: 3.00     Types: Cigarettes     Smokeless tobacco: Never Used   Substance Use Topics     Alcohol use: No     If you drink alcohol do you typically have >3 drinks per day or >7 drinks per week? No    Alcohol Use 7/11/2022   Prescreen: >3 drinks/day or >7 drinks/week? Not Applicable               Current providers sharing in care for this patient include:   Patient Care Team:  Josiah Veras MD as PCP - General (Family Medicine)  Josiah Veras MD as Assigned PCP    The following health maintenance items are reviewed in Epic and correct as of today:  Health Maintenance Due   Topic Date Due     ANNUAL REVIEW OF HM ORDERS   7/11/2022     ADVANCE CARE PLANNING   already has advanced directive     HEPATITIS C SCREENING   Jairan/11/2022     LUNG CANCER SCREENING  Never done      "ZOSTER IMMUNIZATION (2 of 3) 07/22/2016     MEDICARE ANNUAL WELLNESS VISIT  08/04/2018     COVID-19 Vaccine (4 - Booster for Moderna series) 03/16/2022           Review of Systems   Constitutional: Negative for chills and fever.   HENT: Negative for congestion, ear pain, hearing loss and sore throat.    Eyes: Negative for pain and visual disturbance.   Respiratory: Negative for cough and shortness of breath.    Cardiovascular: Negative for chest pain, palpitations and peripheral edema.   Gastrointestinal: Negative for abdominal pain, constipation, diarrhea, heartburn, hematochezia and nausea.   Genitourinary: Negative for dysuria, frequency, genital sores, hematuria, impotence, penile discharge and urgency.   Musculoskeletal: Positive for arthralgias. Negative for joint swelling and myalgias.   Skin: Negative for rash.   Neurological: Negative for dizziness, weakness, headaches and paresthesias.   Psychiatric/Behavioral: Negative for mood changes. The patient is not nervous/anxious.          OBJECTIVE:   /80 (BP Location: Left arm, Patient Position: Sitting, Cuff Size: Adult Large)   Pulse 64   Resp 14   Wt 77.4 kg (170 lb 11.2 oz)   SpO2 98%   BMI 24.49 kg/m   Estimated body mass index is 24.49 kg/m  as calculated from the following:    Height as of 11/29/21: 1.778 m (5' 10\").    Weight as of this encounter: 77.4 kg (170 lb 11.2 oz).  Physical Exam  /80 (BP Location: Left arm, Patient Position: Sitting, Cuff Size: Adult Large)   Pulse 64   Resp 14   Wt 77.4 kg (170 lb 11.2 oz)   SpO2 98%   BMI 24.49 kg/m      General Appearance:  Alert, cooperative, no distress, appears stated age   Head:  Normocephalic, without obvious abnormality, atraumatic   Eyes:  PERRL, conjunctiva/corneas clear, EOM's intact   Ears:  Normal TM's and external ear canals, both ears   Nose: Nares normal, septum midline, mucosa normal, no drainage   Throat: Lips, mucosa, and tongue normal; teeth and gums normal   Neck: " Supple, symmetrical, trachea midline, no adenopathy, thyroid: not enlarged, symmetric, no tenderness/mass/nodules, no carotid bruit or JVD   Back:   Symmetric, no curvature, ROM normal, no CVA tenderness   Lungs:   Clear to auscultation bilaterally, respirations unlabored   Chest Wall:  No tenderness or deformity   Heart:  Regular rate and rhythm, S1, S2 normal, no murmur, rub or gallop   Abdomen:   Soft, non-tender, bowel sounds active all four quadrants,  no masses, no organomegaly   Genitalia:   Circumcised.  No penile lesions.  Small cystic area right side of scrotum suggestive of spermatocele.  No inguinal hernia   Rectal:  Normal tone, normal prostate, no masses or tenderness.  Prominent external hemorrhoids   Extremities: Extremities normal, atraumatic, no cyanosis or edema   Skin: Skin color, texture, turgor normal, no rashes or lesions   Lymph nodes: Cervical and supraclavicular normal   Neurologic: No dysarthria or aphasia.  Cranial nerves, motor or sensory exams intact with symmetric DTRs         Diagnostic Test Results:  Labs reviewed in Epic  Results for orders placed or performed in visit on 07/11/22 (from the past 24 hour(s))   EKG 12-lead, tracing only   Result Value Ref Range    Systolic Blood Pressure  mmHg    Diastolic Blood Pressure  mmHg    Ventricular Rate 56 BPM    Atrial Rate 56 BPM    CA Interval 178 ms    QRS Duration 140 ms     ms    QTc 430 ms    P Axis 36 degrees    R AXIS -56 degrees    T Axis 25 degrees    Interpretation ECG       Sinus bradycardia  Right bundle branch block  Left anterior fascicular block   Bifascicular block   abnormal ECG  When compared with ECG of 01-JUN-2021 04:56,  No significant change was found  Confirmed by KIMBER PORTER MD LOC: ([id number]) on 7/11/2022 4:39:35 PM     UA Macro with Reflex to Micro and Culture - lab collect    Specimen: Urine, Midstream   Result Value Ref Range    Color Urine Yellow Colorless, Straw, Light Yellow, Yellow    Appearance Urine  Clear Clear    Glucose Urine Negative Negative mg/dL    Bilirubin Urine Negative Negative    Ketones Urine Negative Negative mg/dL    Specific Gravity Urine 1.015 1.005 - 1.030    Blood Urine Trace (A) Negative    pH Urine 7.0 5.0 - 8.0    Protein Albumin Urine Negative Negative mg/dL    Urobilinogen Urine 0.2 0.2, 1.0 E.U./dL    Nitrite Urine Negative Negative    Leukocyte Esterase Urine Negative Negative   Urine Microscopic   Result Value Ref Range    Bacteria Urine None Seen None Seen /HPF    RBC Urine None Seen 0-2 /HPF /HPF    WBC Urine None Seen 0-5 /HPF /HPF    Squamous Epithelials Urine Few (A) None Seen /LPF    Narrative    Urine Culture not indicated   PSA, screen   Result Value Ref Range    Prostate Specific Antigen Screen 0.72 0.00 - 4.50 ng/mL    Narrative    This result is obtained using the Roche Elecsys total PSA method on the freida [id number] immunoassay analyzer. Results obtained with different assay methods or kits cannot be used interchangeably.   Comprehensive metabolic panel   Result Value Ref Range    Sodium 140 136 - 145 mmol/L    Potassium 4.1 3.4 - 5.3 mmol/L    Creatinine 1.00 0.67 - 1.17 mg/dL    Urea Nitrogen 11.1 8.0 - 23.0 mg/dL    Chloride 103 98 - 107 mmol/L    Carbon Dioxide (CO2) 27 22 - 29 mmol/L    Anion Gap 10 7 - 15 mmol/L    Glucose 96 70 - 99 mg/dL    Calcium 9.7 8.8 - 10.2 mg/dL    Protein Total 7.3 6.4 - 8.3 g/dL    Albumin 4.6 3.5 - 5.2 g/dL    Bilirubin Total 0.7 <=1.2 mg/dL    Alkaline Phosphatase 55 40 - 129 U/L    AST 25 10 - 50 U/L    ALT 21 10 - 50 U/L    GFR Estimate 81 >60 mL/min/1.73m2   Lipid panel reflex to direct LDL Fasting   Result Value Ref Range    Cholesterol 205 (H) <200 mg/dL    Triglycerides 82 <150 mg/dL    Direct Measure HDL 66 >=40 mg/dL    LDL Cholesterol Calculated 123 (H) <=100 mg/dL    Non HDL Cholesterol 139 (H) <130 mg/dL    Narrative    Cholesterol  Desirable:  <200 mg/dL    Triglycerides  Normal:  Less than 150 mg/dL  Borderline High:  150-199  mg/dL  High:  200-499 mg/dL  Very High:  Greater than or equal to 500 mg/dL    Direct Measure HDL  Female:  Greater than or equal to 50 mg/dL   Male:  Greater than or equal to 40 mg/dL    LDL Cholesterol  Desirable:  <100mg/dL  Above Desirable:  100-129 mg/dL   Borderline High:  130-159 mg/dL   High:  160-189 mg/dL   Very High:  >= 190 mg/dL    Non HDL Cholesterol  Desirable:  130 mg/dL  Above Desirable:  130-159 mg/dL  Borderline High:  160-189 mg/dL  High:  190-219 mg/dL  Very High:  Greater than or equal to 220 mg/dL   CBC with platelets   Result Value Ref Range    WBC Count 5.4 4.0 - 11.0 10e3/uL    RBC Count 5.35 4.40 - 5.90 10e6/uL    Hemoglobin 16.5 13.3 - 17.7 g/dL    Hematocrit 49.9 40.0 - 53.0 %    MCV 93 78 - 100 fL    MCH 30.8 26.5 - 33.0 pg    MCHC 33.1 31.5 - 36.5 g/dL    RDW 13.3 10.0 - 15.0 %    Platelet Count 235 150 - 450 10e3/uL       ASSESSMENT / PLAN:   (Z00.00) Medicare annual wellness visit, subsequent  (primary encounter diagnosis)  Comment: Vinod is  and retired.  No cognitive deficits are noted.  His health risk assessment was reviewed.  Overall health maintenance habits are good.  He does have a healthcare directive  Plan: Appropriate immunizations, health maintenance and screening issues were discussed    (I45.2) Right bundle branch block (RBBB) with left anterior hemiblock  Comment: No symptoms to suggest more advanced degrees of heart block were noted.  EKG will be checked again today.  He was advised to seek evaluation for any problems with syncope or near syncope in the future.  Plan: EKG 12-lead, tracing only            (D12.6) Benign neoplasm of colon, unspecified part of colon  Comment: Last colonoscopy was in 4/20.  3-year follow-up was recommended  Plan: CBC with platelets        Next colonoscopy due 4/2023    (I10) Essential hypertension  Comment: Good control with losartan  Plan: Comprehensive metabolic panel, Lipid panel         reflex to direct LDL Fasting, UA Macro  "with         Reflex to Micro and Culture - lab collect,         Urine Microscopic            (Z12.5) Screening for prostate cancer  Comment: PSA will be checked  Plan: PSA, screen, PSA, screen          Chronic constipation:  Uses MiraLAX twice daily and feels symptoms are improved    Patient Instructions   1.  Recheck EKG    2.  I will notify of test results    3.  Consider CT scan for colon cancer screening    4.  Check insurance for Shingrix vaccine.  This would be advised.    5.  See in one year or as needed.        COUNSELING:  Reviewed preventive health counseling, as reflected in patient instructions       Regular exercise       Healthy diet/nutrition       Consider lung cancer screening for ages 55-80 years (77 for Medicare) and 20 pack-year smoking history appropriate vaccinations are reviewed       Colon cancer screening       Prostate cancer screening          Estimated body mass index is 24.49 kg/m  as calculated from the following:    Height as of 11/29/21: 1.778 m (5' 10\").    Weight as of this encounter: 77.4 kg (170 lb 11.2 oz).        He reports that he has quit smoking. His smoking use included cigarettes. He quit after 3.00 years of use. He has never used smokeless tobacco.      Appropriate preventive services were discussed with this patient, including applicable screening as appropriate for cardiovascular disease, diabetes, osteopenia/osteoporosis, and glaucoma.  As appropriate for age/gender, discussed screening for colorectal cancer, prostate cancer, breast cancer, and cervical cancer. Checklist reviewing preventive services available has been given to the patient.    Reviewed patients plan of care and provided an AVS. The Basic Care Plan (routine screening as documented in Health Maintenance) for Darion meets the Care Plan requirement. This Care Plan has been established and reviewed with the Patient.    Counseling Resources:  ATP IV Guidelines  Pooled Cohorts Equation Calculator  Breast Cancer " Risk Calculator  Breast Cancer: Medication to Reduce Risk  FRAX Risk Assessment  ICSI Preventive Guidelines  Dietary Guidelines for Americans, 2010  [employer]'s MyPlate  ASA Prophylaxis  Lung CA Screening    Josiah Veras MD  [facility]    Identified Health Risks:

## 2022-09-29 DIAGNOSIS — I10 ESSENTIAL HYPERTENSION, BENIGN: ICD-10-CM

## 2022-09-29 RX ORDER — LOSARTAN POTASSIUM 50 MG/1
75 TABLET ORAL DAILY
Qty: 135 TABLET | Refills: 3 | Status: SHIPPED | OUTPATIENT
Start: 2022-09-29 | End: 2023-08-03

## 2022-09-29 NOTE — TELEPHONE ENCOUNTER
"Last Written Prescription Date:  9/29/2021  Last Fill Quantity: 135,  # refills: 3   Last office visit provider:  7/11/2022     Requested Prescriptions   Pending Prescriptions Disp Refills     losartan (COZAAR) 50 MG tablet [Pharmacy Med Name: Losartan Potassium Oral Tablet 50 MG] 135 tablet 0     Sig: Take 1.5 tablets (75 mg) by mouth daily       Angiotensin-II Receptors Passed - 9/29/2022  1:27 PM        Passed - Last blood pressure under 140/90 in past 12 months     BP Readings from Last 3 Encounters:   07/11/22 124/80   11/29/21 112/70   09/18/20 130/82                 Passed - Recent (12 mo) or future (30 days) visit within the authorizing provider's specialty     Patient has had an office visit with the authorizing provider or a provider within the authorizing providers department within the previous 12 mos or has a future within next 30 days. See \"Patient Info\" tab in inbasket, or \"Choose Columns\" in Meds & Orders section of the refill encounter.              Passed - Medication is active on med list        Passed - Patient is age 18 or older        Passed - Normal serum creatinine on file in past 12 months     Recent Labs   Lab Test 07/11/22  0912   CR 1.00       Ok to refill medication if creatinine is low          Passed - Normal serum potassium on file in past 12 months     Recent Labs   Lab Test 07/11/22 0912   POTASSIUM 4.1                         Jasmina Jimenez RN 09/29/22 1:28 PM  "

## 2022-11-19 ENCOUNTER — HEALTH MAINTENANCE LETTER (OUTPATIENT)
Age: 67
End: 2022-11-19

## 2023-08-03 DIAGNOSIS — I10 ESSENTIAL HYPERTENSION, BENIGN: ICD-10-CM

## 2023-08-03 NOTE — TELEPHONE ENCOUNTER
Former Dr. Veras patient, has an appointment scheduled for 10/9/23 to establish care at the [facility] with Dr. Rodriguez.  Patient needs a prescription and refills to get him through until his appointment on 10/9/23

## 2023-08-04 RX ORDER — LOSARTAN POTASSIUM 50 MG/1
75 TABLET ORAL DAILY
Qty: 135 TABLET | Refills: 0 | Status: SHIPPED | OUTPATIENT
Start: 2023-08-04 | End: 2023-10-09

## 2023-08-04 NOTE — TELEPHONE ENCOUNTER
"Routing refill request to provider for review/approval because:  Former patient of Dr. Veras & has not established care with another provider.  Please assign refill request to covering provider per clinic standard process.      Last Written Prescription Date:  9/29/22 - Dr. Veras   Last Fill Quantity: 135,  # refills: 3   Last office visit provider:  7/11/22 - annual wellness visit with Dr. Veras     Requested Prescriptions   Pending Prescriptions Disp Refills    losartan (COZAAR) 50 MG tablet 135 tablet 3     Sig: Take 1.5 tablets (75 mg) by mouth daily       Angiotensin-II Receptors Failed - 8/3/2023  3:48 PM        Failed - Last blood pressure under 140/90 in past 12 months     BP Readings from Last 3 Encounters:   07/11/22 124/80   11/29/21 112/70   09/18/20 130/82                 Failed - Recent (12 mo) or future (30 days) visit within the authorizing provider's specialty     Patient has had an office visit with the authorizing provider or a provider within the authorizing providers department within the previous 12 mos or has a future within next 30 days. See \"Patient Info\" tab in inbasket, or \"Choose Columns\" in Meds & Orders section of the refill encounter.              Failed - Normal serum creatinine on file in past 12 months     Recent Labs   Lab Test 07/11/22 0912   CR 1.00       Ok to refill medication if creatinine is low          Failed - Normal serum potassium on file in past 12 months     Recent Labs   Lab Test 07/11/22 0912   POTASSIUM 4.1                    Passed - Medication is active on med list        Passed - Patient is age 18 or older             Kandy Souza RN 08/04/23 7:06 AM  "

## 2023-09-09 ENCOUNTER — HEALTH MAINTENANCE LETTER (OUTPATIENT)
Age: 68
End: 2023-09-09

## 2023-10-09 ENCOUNTER — TELEPHONE (OUTPATIENT)
Dept: FAMILY MEDICINE | Facility: CLINIC | Age: 68
End: 2023-10-09

## 2023-10-09 ENCOUNTER — OFFICE VISIT (OUTPATIENT)
Dept: FAMILY MEDICINE | Facility: CLINIC | Age: 68
End: 2023-10-09
Payer: MEDICARE

## 2023-10-09 VITALS
TEMPERATURE: 97.9 F | WEIGHT: 175.8 LBS | RESPIRATION RATE: 14 BRPM | HEART RATE: 79 BPM | SYSTOLIC BLOOD PRESSURE: 150 MMHG | DIASTOLIC BLOOD PRESSURE: 96 MMHG | OXYGEN SATURATION: 98 % | BODY MASS INDEX: 27.59 KG/M2 | HEIGHT: 67 IN

## 2023-10-09 DIAGNOSIS — K60.2 ANAL FISSURE: ICD-10-CM

## 2023-10-09 DIAGNOSIS — Z23 NEED FOR SHINGLES VACCINE: ICD-10-CM

## 2023-10-09 DIAGNOSIS — K56.609 SBO (SMALL BOWEL OBSTRUCTION) (H): ICD-10-CM

## 2023-10-09 DIAGNOSIS — I10 ESSENTIAL HYPERTENSION, BENIGN: Primary | ICD-10-CM

## 2023-10-09 DIAGNOSIS — Z12.5 SCREENING FOR PROSTATE CANCER: ICD-10-CM

## 2023-10-09 DIAGNOSIS — D12.6 BENIGN NEOPLASM OF COLON, UNSPECIFIED PART OF COLON: ICD-10-CM

## 2023-10-09 DIAGNOSIS — D12.6 ADENOMATOUS POLYP OF COLON, UNSPECIFIED PART OF COLON: ICD-10-CM

## 2023-10-09 LAB
ALBUMIN SERPL BCG-MCNC: 4.6 G/DL (ref 3.5–5.2)
ALP SERPL-CCNC: 56 U/L (ref 40–129)
ALT SERPL W P-5'-P-CCNC: 23 U/L (ref 0–70)
ANION GAP SERPL CALCULATED.3IONS-SCNC: 9 MMOL/L (ref 7–15)
AST SERPL W P-5'-P-CCNC: 28 U/L (ref 0–45)
BASO+EOS+MONOS # BLD AUTO: NORMAL 10*3/UL
BASO+EOS+MONOS NFR BLD AUTO: NORMAL %
BASOPHILS # BLD AUTO: 0 10E3/UL (ref 0–0.2)
BASOPHILS NFR BLD AUTO: 0 %
BILIRUB DIRECT SERPL-MCNC: <0.2 MG/DL (ref 0–0.3)
BILIRUB SERPL-MCNC: 0.6 MG/DL
BUN SERPL-MCNC: 12.4 MG/DL (ref 8–23)
CALCIUM SERPL-MCNC: 9.9 MG/DL (ref 8.8–10.2)
CHLORIDE SERPL-SCNC: 104 MMOL/L (ref 98–107)
CHOLEST SERPL-MCNC: 198 MG/DL
CREAT SERPL-MCNC: 0.93 MG/DL (ref 0.67–1.17)
DEPRECATED HCO3 PLAS-SCNC: 28 MMOL/L (ref 22–29)
EGFRCR SERPLBLD CKD-EPI 2021: 88 ML/MIN/1.73M2
EOSINOPHIL # BLD AUTO: 0.1 10E3/UL (ref 0–0.7)
EOSINOPHIL NFR BLD AUTO: 2 %
ERYTHROCYTE [DISTWIDTH] IN BLOOD BY AUTOMATED COUNT: 13.5 % (ref 10–15)
GLUCOSE SERPL-MCNC: 104 MG/DL (ref 70–99)
HCT VFR BLD AUTO: 48.8 % (ref 40–53)
HDLC SERPL-MCNC: 62 MG/DL
HGB BLD-MCNC: 16.3 G/DL (ref 13.3–17.7)
IMM GRANULOCYTES # BLD: 0 10E3/UL
IMM GRANULOCYTES NFR BLD: 0 %
LDLC SERPL CALC-MCNC: 119 MG/DL
LYMPHOCYTES # BLD AUTO: 1.4 10E3/UL (ref 0.8–5.3)
LYMPHOCYTES NFR BLD AUTO: 23 %
MCH RBC QN AUTO: 31.3 PG (ref 26.5–33)
MCHC RBC AUTO-ENTMCNC: 33.4 G/DL (ref 31.5–36.5)
MCV RBC AUTO: 94 FL (ref 78–100)
MONOCYTES # BLD AUTO: 0.6 10E3/UL (ref 0–1.3)
MONOCYTES NFR BLD AUTO: 10 %
NEUTROPHILS # BLD AUTO: 3.7 10E3/UL (ref 1.6–8.3)
NEUTROPHILS NFR BLD AUTO: 64 %
NONHDLC SERPL-MCNC: 136 MG/DL
PLATELET # BLD AUTO: 253 10E3/UL (ref 150–450)
POTASSIUM SERPL-SCNC: 4.8 MMOL/L (ref 3.4–5.3)
PROT SERPL-MCNC: 7.4 G/DL (ref 6.4–8.3)
PSA SERPL DL<=0.01 NG/ML-MCNC: 0.54 NG/ML (ref 0–6.5)
RBC # BLD AUTO: 5.2 10E6/UL (ref 4.4–5.9)
SODIUM SERPL-SCNC: 141 MMOL/L (ref 135–145)
TRIGL SERPL-MCNC: 83 MG/DL
WBC # BLD AUTO: 5.8 10E3/UL (ref 4–11)

## 2023-10-09 PROCEDURE — 99214 OFFICE O/P EST MOD 30 MIN: CPT | Performed by: FAMILY MEDICINE

## 2023-10-09 PROCEDURE — G0103 PSA SCREENING: HCPCS | Performed by: FAMILY MEDICINE

## 2023-10-09 PROCEDURE — 80053 COMPREHEN METABOLIC PANEL: CPT | Performed by: FAMILY MEDICINE

## 2023-10-09 PROCEDURE — 82248 BILIRUBIN DIRECT: CPT | Performed by: FAMILY MEDICINE

## 2023-10-09 PROCEDURE — 85025 COMPLETE CBC W/AUTO DIFF WBC: CPT | Performed by: FAMILY MEDICINE

## 2023-10-09 PROCEDURE — 36415 COLL VENOUS BLD VENIPUNCTURE: CPT | Performed by: FAMILY MEDICINE

## 2023-10-09 PROCEDURE — 80061 LIPID PANEL: CPT | Performed by: FAMILY MEDICINE

## 2023-10-09 RX ORDER — LOSARTAN POTASSIUM 50 MG/1
75 TABLET ORAL DAILY
Qty: 135 TABLET | Refills: 3 | Status: SHIPPED | OUTPATIENT
Start: 2023-10-09 | End: 2023-12-28

## 2023-10-09 NOTE — PATIENT INSTRUCTIONS
-Thank you for choosing the [facility].  -It was a pleasure to see you today.  -Please take a look at the information below for more specific details regarding the treatment plan and recommendations.  -In this after visit summary is a list of your medications and specific instructions.  Please review this carefully as there may be changes made to your medication list.  -If there are any particular questions or concerns, please feel free to reach out to Dr. Rodriguez.  -If any labs have been completed, we will reach out to you about results.  If the results are normal or not concerning, a letter or SnipSnaphart message will be sent to you.  If any follow-up is needed, either Dr. Rodriguez or the nurse will give you a call.  If you have not heard regarding results after 2 weeks, please reach out to the clinic.    Patient Instructions:    -Dr. Rodriguez recommends that you check your blood pressure 1-2 times daily for the next 2 weeks.  Record the date, time, blood pressure readings, and heart rate.  -When checking your blood pressure, find a location where the area is calm and quiet. Try to sit down for 3-5 minutes first. Using a blood pressure cuff that wraps around the upper arm is more accurate. Keep your wrist facing up and legs straight. (If using a wrist cuff, be sure to hold the cuff up to the level of the heart when checking your blood pressure) As the blood pressure machine is working, do not talk or do anything else.   -The goal is to have the blood pressure under 140/90 on a consistent basis.  Blood pressures above this range increases your risk of developing heart attacks, strokes, kidney issues, and many other medical issues.  -Try to limit salt intake.  Following a low-salt diet (eating about 7102-6048 mg or less of salt each day) can be helpful to control the blood pressure.  -Losing weight and getting your BMI in the normal range can also be helpful to better control the blood pressure.  -Find ways  "to stay active.   -Please see the end of the packet for more information regarding elevated blood pressures and things you can do at home to help improve the blood pressure.    -Stay well-hydrated.  Try to drink 64 ounces of water each day.  Extra water will help the stools become softer.  -Stay active as this helps to slow movement through the body.  -Increase your intake of fruits and vegetables, increase the fiber intake to help the stools become softer.  -Fruits that start with the letter \"P\" helps with \"pooping.\"  For example: Pears, prunes, peaches, pineapples, etc.  -Try to limit use of over-the-counter stool softener medications.  Long-term consistent use of these medications can cause your body to become reliant on them.      Please seek immediate medical attention (go to the emergency room or urgent care) for the following reasons: worsening symptoms, or any concerning changes.      --------------------------------------------------------------------------------------------------------------------    -We are always looking for ways to improve.  You may be selected to receive a survey regarding your visit today.  We encourage you to complete the survey and provide specific, constructive feedback to help us improve our processes.  Thank you for your time!  -Please review the contact information listed on the after visit summary and in the electronic chart.  Below is the phone number that we have on file.  If there are any changes that are needed to be made, please reach out to the clinic.  [phone] (home) [phone] (work)    "

## 2023-10-09 NOTE — TELEPHONE ENCOUNTER
Patient Quality Outreach    Patient is due for the following:   Physical Annual Wellness Visit    Next Steps:   Patient was scheduled for AWV    Type of outreach:    In person       Questions for provider review:    None           Anaid John MA

## 2023-10-09 NOTE — PROGRESS NOTES
"OFFICE VISIT    Assessment/Plan:     Patient Instructions:    -Dr. Rodriguez recommends that you check your blood pressure 1-2 times daily for the next 2 weeks.  Record the date, time, blood pressure readings, and heart rate.  -When checking your blood pressure, find a location where the area is calm and quiet. Try to sit down for 3-5 minutes first. Using a blood pressure cuff that wraps around the upper arm is more accurate. Keep your wrist facing up and legs straight. (If using a wrist cuff, be sure to hold the cuff up to the level of the heart when checking your blood pressure) As the blood pressure machine is working, do not talk or do anything else.   -The goal is to have the blood pressure under 140/90 on a consistent basis.  Blood pressures above this range increases your risk of developing heart attacks, strokes, kidney issues, and many other medical issues.  -Try to limit salt intake.  Following a low-salt diet (eating about 9259-7646 mg or less of salt each day) can be helpful to control the blood pressure.  -Losing weight and getting your BMI in the normal range can also be helpful to better control the blood pressure.  -Find ways to stay active.   -Please see the end of the packet for more information regarding elevated blood pressures and things you can do at home to help improve the blood pressure.    -Stay well-hydrated.  Try to drink 64 ounces of water each day.  Extra water will help the stools become softer.  -Stay active as this helps to slow movement through the body.  -Increase your intake of fruits and vegetables, increase the fiber intake to help the stools become softer.  -Fruits that start with the letter \"P\" helps with \"pooping.\"  For example: Pears, prunes, peaches, pineapples, etc.  -Try to limit use of over-the-counter stool softener medications.  Long-term consistent use of these medications can cause your body to become reliant on them.      Please seek immediate medical attention (go to " the emergency room or urgent care) for the following reasons: worsening symptoms, or any concerning changes.      Darion was seen today for establish care.  Diagnoses and all orders for this visit:    Essential hypertension, benign: Blood pressure noted to be elevated today.  Patient is asymptomatic.  Previously controlled on losartan 75 mg once daily.  Check labs as below.  Prescription given to patient for blood pressure machine.  Plan to check blood pressure regularly for the next 2 weeks.  He will reach out regarding the blood pressure results.  Further recommendations pending findings.  -     losartan (COZAAR) 50 MG tablet; Take 1.5 tablets (75 mg) by mouth daily  -     Miscellaneous DME  -     Basic metabolic panel; Future  -     CBC with Platelets & Differential; Future  -     Lipid panel reflex to direct LDL Fasting; Future  -     Hepatic function panel; Future    Adenomatous polyp of colon, unspecified part of colon  Benign neoplasm of colon, unspecified part of colon  Anal fissure  SBO (small bowel obstruction) (H): Had a colonoscopy completed recently.  He will continue to follow the recommendations.  Previous history of constipation with a presence of anal fissures.  He has been consistent with following a good bowel regimen and that has been helpful for him.    Need for shingles vaccine: Patient connected with his insurance for completion of the Shingrix vaccine.    Screening for prostate cancer  -     Prostate Specific Antigen Screen; Future        Return for As scheduled for the Medicare wellness visit.    The diagnoses, treatment options, risk, benefits, and recommendations were reviewed with patient/guardian.  Questions were answered to patient's/guardian satisfaction.  Red flag signs were reviewed.  Patient/guardian is in agreement with above plan.      Subjective: 68 year old male with history of hypertension, constipation, polyp (benign) of colon, arthritis, right bundle branch block with left  anterior hemiblock, diverticulosis who presents to clinic for the following complaints:   Patient presents with:  Establish Care    His previous PCP retired.     Answers submitted by the patient for this visit:  General Questionnaire (Submitted on 10/9/2023)  Chief Complaint: Chronic problems general questions HPI Form  What is the reason for your visit today? : new doctor  How many servings of fruits and vegetables do you eat daily?: 0-1  On average, how many sweetened beverages do you drink each day (Examples: soda, juice, sweet tea, etc.  Do NOT count diet or artificially sweetened beverages)?: 0  How many minutes a day do you exercise enough to make your heart beat faster?: 9 or less  How many days a week do you exercise enough to make your heart beat faster?: 7  How many days per week do you miss taking your medication?: 0    Hypertension: Patient currently takes 75 mg of losartan once daily.  Blood pressure today is 170/98.  On recheck by provider, blood pressure is 150/96.  Patient denies any chest pain, headaches, dizziness, lightheadedness, weakness, or other changes from baseline.  He has been walking 2-3 miles every day. He works around the house.   His wife just passed      BP was 134/94 at the colonoscopy last Tuesday.      3/4/2020  3:58 PM 9/18/2020  7:53 AM 6/1/2021  5:00 AM 6/1/2021  8:23 AM 11/29/2021  12:47 PM 7/11/2022  7:44 AM 10/9/2023  9:38 AM   Vital Signs          Systolic 110  130    112  124  170 !    Diastolic 74  82    70  80  78 !    Pulse 88  60    84  64  79       10/9/2023  9:42 AM   Vital Signs    Systolic 170 !    Diastolic 98 (H)    Pulse       Legend:  ! Abnormal  (H) High    Constipation: Patient uses MiraLAX as needed twice daily due to the tears in his lining. He watches what he eats and chews well. He gets unit(s) about 3 AM and the system kicks in right away and he is regular.     Has history of colonic polyps with last colonoscopy in 04/2020.  Plan for repeat colonoscopy in  "3 years.  He has completed the colonoscopy.    HM due was reviewed with patient/parent.  Recommendations, risk, benefits were reviewed.  Accepted recommendations were ordered.  Otherwise, patient/parent declined.    Health Maintenance Due   Topic Date Due    HEPATITIS C SCREENING  Never done    LUNG CANCER SCREENING  Never done    ZOSTER IMMUNIZATION (2 of 3) 07/22/2016    MEDICARE ANNUAL WELLNESS VISIT  07/11/2023    ANNUAL REVIEW OF HM ORDERS  07/11/2023    COVID-19 Vaccine (6 - 2023-24 season) 09/01/2023     He has the COVID vaccine scheduled soon.   He will be working on getting the shingrix vaccine.     [facility] Records:   COVID Pandemic 03/10/2021 1 of 1 Spikevax-MOD 100mcg Full   No   COVID Pandemic 04/07/2021  Spikevax-MOD 100mcg Full   No   COVID Pandemic 11/16/2021  Spikevax-MOD 100mcg Full   No   COVID Pandemic 07/27/2022  Spikevax-MOD 100mcg Full   No   COVID Pandemic 12/27/2022  Spikevax-MOD Biv. 50mcg Full   No       The 10 point review of system is negative except as stated in the HPI.    Allergies were reviewed and updated.    Objective:   BP (!) 150/96 (BP Location: Right arm, Patient Position: Sitting, Cuff Size: Adult Regular)   Pulse 79   Temp 97.9  F (36.6  C) (Oral)   Resp 14   Ht 1.705 m (5' 7.13\")   Wt 79.7 kg (175 lb 12.8 oz)   SpO2 98%   BMI 27.43 kg/m    General: Active, alert, nontoxic-appearing.  No acute distress.  HEENT: Normocephalic, atraumatic.  Pupils are equal and round.  Sclera is clear.  Normal external ears. Nares patent.  Moist mucous membranes.    Cardiac: RRR.  S1, S2 present.  No murmurs, rubs, or gallops.  Respiratory/chest: Clear to auscultation bilaterally.  No wheezes, rales, rhonchi.  Breathing is not labored.  No accessory muscle usage.  Abdomen: Soft, nondistended, nontender.  No masses or organomegaly noted.  No guarding or rebound tenderness appreciated.  Extremities: Voluntary movements intact.  Integumentary: No concerning rash or skin changes " appreciated.        Allen Rodriguez MD  [facility] [place] [address]  Phone: [phone]  Fax: [phone]    10/9/2023  5:58 PM          Current Outpatient Medications   Medication    losartan (COZAAR) 50 MG tablet    polyethylene glycol (MIRALAX) 17 GM/Dose powder     No current facility-administered medications for this visit.       Allergies   Allergen Reactions    Lisinopril Cough       Patient Active Problem List    Diagnosis Date Noted    SBO (small bowel obstruction) (H) 06/01/2021     Priority: Medium    Essential hypertension      Priority: Medium     Created by Conversion  Replacement Utility updated for latest IMO load        Polyp of colon 04/06/2020     Priority: Medium    Right bundle branch block (RBBB) with left anterior hemiblock      Priority: Medium     Created by Conversion        Anal fissure 12/07/2016     Priority: Medium    Arthritis 04/13/2016     Priority: Medium    Benign Polyps Of The Large Intestine      Priority: Medium     Created by Conversion        Diverticular disease of colon 05/03/2013     Priority: Medium       Family History   Problem Relation Age of Onset    Cancer Mother     Cancer Father        Past Surgical History:   Procedure Laterality Date    NO PAST SURGERIES          Social History     Socioeconomic History    Marital status:      Spouse name: Not on file    Number of children: Not on file    Years of education: Not on file    Highest education level: Not on file   Occupational History    Not on file   Tobacco Use    Smoking status: Former     Years: 3.00     Types: Cigarettes    Smokeless tobacco: Never   Vaping Use    Vaping Use: Never used   Substance and Sexual Activity    Alcohol use: No    Drug use: No    Sexual activity: Not on file   Other Topics Concern    Not on file   Social History Narrative    Not on file     Social Determinants of Health     Financial Resource Strain: Low Risk  (10/9/2023)    Financial Resource  Strain     Within the past 12 months, have you or your family members you live with been unable to get utilities (heat, electricity) when it was really needed?: No   Food Insecurity: Low Risk  (10/9/2023)    Food Insecurity     Within the past 12 months, did you worry that your food would run out before you got money to buy more?: No     Within the past 12 months, did the food you bought just not last and you didn t have money to get more?: No   Transportation Needs: Low Risk  (10/9/2023)    Transportation Needs     Within the past 12 months, has lack of transportation kept you from medical appointments, getting your medicines, non-medical meetings or appointments, work, or from getting things that you need?: No   Physical Activity: Not on file   Stress: Not on file   Social Connections: Not on file   Interpersonal Safety: Low Risk  (10/9/2023)    Interpersonal Safety     Do you feel physically and emotionally safe where you currently live?: Yes     Within the past 12 months, have you been hit, slapped, kicked or otherwise physically hurt by someone?: No     Within the past 12 months, have you been humiliated or emotionally abused in other ways by your partner or ex-partner?: No   Housing Stability: Low Risk  (10/9/2023)    Housing Stability     Do you have housing? : Yes     Are you worried about losing your housing?: No

## 2023-10-10 ENCOUNTER — TELEPHONE (OUTPATIENT)
Dept: FAMILY MEDICINE | Facility: CLINIC | Age: 68
End: 2023-10-10
Payer: MEDICARE

## 2023-10-10 DIAGNOSIS — E78.2 MIXED HYPERLIPIDEMIA: Primary | ICD-10-CM

## 2023-10-10 RX ORDER — ATORVASTATIN CALCIUM 40 MG/1
40 TABLET, FILM COATED ORAL AT BEDTIME
Qty: 90 TABLET | Refills: 3 | Status: SHIPPED | OUTPATIENT
Start: 2023-10-10 | End: 2024-02-08

## 2023-10-10 NOTE — TELEPHONE ENCOUNTER
RN reviewed medication usage and possible side effects with pt. Pt has no further questions. Pt would like to try the medication. Pt was concerned that insurance might not cover. Writer informed pt that pharmacy will let pt know if rx is not covered once orders are in and they run it through insurance.    Please send rx to [facility] pharmacy on [address].      Walker Reaagn Cem Say, BSN RN  [facility]

## 2023-10-10 NOTE — TELEPHONE ENCOUNTER
Received message below.     Called and no answer. Left VM that provider will call back later or patient may call back as well.     Allen Rodriguez MD  [facility] [place] [address]  Phone: [phone]  Fax: [phone]    10/10/2023  11:31 AM          Results note from 10/09/2023:   Gonzalo Day,     I hope you have been well since out last visit. Below are the results from the testing completed at the visit.     The following results were normal or not concerning: Kidney function, electrolytes, liver function, prostate test, blood counts.     The cholesterol tests was slightly elevated for the LDL or bad cholesterol.  The remainder of the cholesterol values were in the good range.  Considering your clinical picture, the risk of you having a heart attack or something similar in the next 10 years is 24.4%.  When this value is greater than 7.5%, it is recommended to start a cholesterol-lowering medication called atorvastatin.  Please let the nurse know if you are willing to start this medication.     Dr. Rodriguez recommends that you continue on the plan as discussed in clinic.     If there are any questions or concerns, please call the clinic or schedule an appointment for follow up.     Best wishes,              Allen Rodriguez MD  [facility]  10/9/2023  4:04 PM     The 10-year ASCVD risk score (Shiraz DUTTA, et al., 2019) is: 24.4%    Values used to calculate the score:      Age: 68 years      Sex: Male      Is Non- : No      Diabetic: No      Tobacco smoker: No      Systolic Blood Pressure: 150 mmHg      Is BP treated: Yes      HDL Cholesterol: 62 mg/dL      Total Cholesterol: 198 mg/dL        Message received on 10/09/2023:  Sancho Rivera MA Vang, Charles, MD  The patient verbalizes understanding of provider/CSS instructions for follow-up and continued care per provider message.    The patient would like to speak with MD  regarding side effects and to ask more questions about the medication. Patient states that he will be expecting a call from MD at his primary number ([phone]).    Okay to call patient on 10/09/23 or 10/10/23.      Atorvastatin Calcium Oral tablet      What is this medicine?  ATORVASTATIN (a TORE va sta tin) is known as a HMG-CoA reductase inhibitor or 'statin'. It lowers the level of cholesterol and triglycerides in the blood. This drug may also reduce the risk of heart attack, stroke, or other health problems in patients with risk factors for heart disease. Diet and lifestyle changes are often used with this drug.  This medicine may be used for other purposes; ask your health care provider or pharmacist if you have questions.    What should I tell my health care provider before I take this medicine?  They need to know if you have any of these conditions:  frequently drink alcoholic beverages  history of stroke, TIA  kidney disease  liver disease  muscle aches or weakness  other medical condition  an unusual or allergic reaction to atorvastatin, other medicines, foods, dyes, or preservatives  pregnant or trying to get pregnant  breast-feeding    How should I use this medicine?  Take this medicine by mouth with a glass of water. Follow the directions on the prescription label. You can take this medicine with or without food. Take your doses at regular intervals. Do not take your medicine more often than directed.  Talk to your pediatrician regarding the use of this medicine in children. While this drug may be prescribed for children as young as 10 years old for selected conditions, precautions do apply.  Overdosage: If you think you have taken too much of this medicine contact a poison control center or emergency room at once.  NOTE: This medicine is only for you. Do not share this medicine with others.    What if I miss a dose?  If you miss a dose, take it as soon as you can. If it is almost time for your next  dose, take only that dose. Do not take double or extra doses.    What may interact with this medicine?  Do not take this medicine with any of the following medications:  red yeast rice  telaprevir  telithromycin  voriconazole  This medicine may also interact with the following medications:  alcohol  antiviral medicines for HIV or AIDS  boceprevir  certain antibiotics like clarithromycin, erythromycin, troleandomycin  certain medicines for cholesterol like fenofibrate or gemfibrozil  cimetidine  clarithromycin  colchicine  cyclosporine  digoxin  female hormones, like estrogens or progestins and birth control pills  grapefruit juice  medicines for fungal infections like fluconazole, itraconazole, ketoconazole  niacin  rifampin  spironolactone  This list may not describe all possible interactions. Give your health care provider a list of all the medicines, herbs, non-prescription drugs, or dietary supplements you use. Also tell them if you smoke, drink alcohol, or use illegal drugs. Some items may interact with your medicine.    What should I watch for while using this medicine?  Visit your doctor or health care professional for regular check-ups. You may need regular tests to make sure your liver is working properly.  Tell your doctor or health care professional right away if you get any unexplained muscle pain, tenderness, or weakness, especially if you also have a fever and tiredness. Your doctor or health care professional may tell you to stop taking this medicine if you develop muscle problems. If your muscle problems do not go away after stopping this medicine, contact your health care professional.  This drug is only part of a total heart-health program. Your doctor or a dietician can suggest a low-cholesterol and low-fat diet to help. Avoid alcohol and smoking, and keep a proper exercise schedule.  Do not use this drug if you are pregnant or breast-feeding. Serious side effects to an unborn child or to an  infant are possible. Talk to your doctor or pharmacist for more information.  This medicine may affect blood sugar levels. If you have diabetes, check with your doctor or health care professional before you change your diet or the dose of your diabetic medicine.  If you are going to have surgery tell your health care professional that you are taking this drug.    What side effects may I notice from receiving this medicine?  Side effects that you should report to your doctor or health care professional as soon as possible:  allergic reactions like skin rash, itching or hives, swelling of the face, lips, or tongue  dark urine  fever  joint pain  muscle cramps, pain  redness, blistering, peeling or loosening of the skin, including inside the mouth  trouble passing urine or change in the amount of urine  unusually weak or tired  yellowing of eyes or skin  Side effects that usually do not require medical attention (report to your doctor or health care professional if they continue or are bothersome):  constipation  heartburn  stomach gas, pain, upset  This list may not describe all possible side effects. Call your doctor for medical advice about side effects. You may report side effects to FDA at 4-088-FDA-8075.    Where should I keep my medicine?  Keep out of the reach of children.  Store at room temperature between 20 to 25 degrees C (68 to 77 degrees F). Throw away any unused medicine after the expiration date.    NOTE:This sheet is a summary. It may not cover all possible information. If you have questions about this medicine, talk to your doctor, pharmacist, or health care provider. Copyright  2016 Gold Standard          .

## 2023-10-10 NOTE — TELEPHONE ENCOUNTER
Orders placed as requested.     Allen Rodriguez MD  [facility]  10/10/2023  6:29 PM    Diagnoses and all orders for this visit:    Mixed hyperlipidemia  -     atorvastatin (LIPITOR) 40 MG tablet; Take 1 tablet (40 mg) by mouth at bedtime

## 2023-10-19 ENCOUNTER — PATIENT OUTREACH (OUTPATIENT)
Dept: GASTROENTEROLOGY | Facility: CLINIC | Age: 68
End: 2023-10-19
Payer: MEDICARE

## 2023-12-12 ENCOUNTER — NURSE TRIAGE (OUTPATIENT)
Dept: FAMILY MEDICINE | Facility: CLINIC | Age: 68
End: 2023-12-12
Payer: MEDICARE

## 2023-12-12 NOTE — TELEPHONE ENCOUNTER
Nasal congestion and drainage worsening x 4 days  -bloody nose from right side once  Cough, throat irritation from drainage  Stanley ill mid-November with URI of some sort  -feels like it never fully went away/never fully recovered  Otherwise feels well  Taking Tylenol and Nyquil PRN    Patient does not feel strongly that he needs to be seen in clinic and is comfortable with continued home care. Reviewed the need to call back or be seen for worsening sx such as severe headache, fever, worsening pressure or fullness in the sinuses, etc. The patient indicates understanding of these issues and agrees with the plan.    YOBANI TerryN, RN  [facility]      Additional Information   Negative: Sounds like a life-threatening emergency to the triager   Negative: Difficulty breathing, and not from stuffy nose (e.g., not relieved by cleaning out the nose)   Negative: SEVERE headache and has fever   Negative: Patient sounds very sick or weak to the triager   Sinus congestion as part of a cold, present < 10 days   Negative: Fever present > 3 days (72 hours)   Negative: Fever returns after gone for over 24 hours and symptoms worse or not improved   Negative: Sinus pain (not just congestion) and fever   Negative: Earache    Protocols used: Sinus Pain and Congestion-A-[place]

## 2023-12-28 ENCOUNTER — OFFICE VISIT (OUTPATIENT)
Dept: FAMILY MEDICINE | Facility: CLINIC | Age: 68
End: 2023-12-28
Payer: MEDICARE

## 2023-12-28 VITALS
TEMPERATURE: 98.1 F | RESPIRATION RATE: 14 BRPM | OXYGEN SATURATION: 98 % | BODY MASS INDEX: 27.62 KG/M2 | HEART RATE: 94 BPM | DIASTOLIC BLOOD PRESSURE: 94 MMHG | SYSTOLIC BLOOD PRESSURE: 162 MMHG | WEIGHT: 176 LBS | HEIGHT: 67 IN

## 2023-12-28 DIAGNOSIS — Z00.00 ENCOUNTER FOR MEDICARE ANNUAL WELLNESS EXAM: Primary | ICD-10-CM

## 2023-12-28 DIAGNOSIS — D12.6 BENIGN NEOPLASM OF COLON, UNSPECIFIED PART OF COLON: ICD-10-CM

## 2023-12-28 DIAGNOSIS — E78.2 MIXED HYPERLIPIDEMIA: ICD-10-CM

## 2023-12-28 DIAGNOSIS — R05.9 COUGH, UNSPECIFIED TYPE: ICD-10-CM

## 2023-12-28 DIAGNOSIS — R09.82 POST-NASAL DRIP: ICD-10-CM

## 2023-12-28 DIAGNOSIS — I10 ESSENTIAL HYPERTENSION, BENIGN: ICD-10-CM

## 2023-12-28 DIAGNOSIS — J32.9 SINUSITIS, UNSPECIFIED CHRONICITY, UNSPECIFIED LOCATION: ICD-10-CM

## 2023-12-28 DIAGNOSIS — Z23 NEED FOR SHINGLES VACCINE: ICD-10-CM

## 2023-12-28 PROCEDURE — G0439 PPPS, SUBSEQ VISIT: HCPCS | Performed by: FAMILY MEDICINE

## 2023-12-28 PROCEDURE — 99214 OFFICE O/P EST MOD 30 MIN: CPT | Mod: 25 | Performed by: FAMILY MEDICINE

## 2023-12-28 RX ORDER — RESPIRATORY SYNCYTIAL VIRUS VACCINE 120MCG/0.5
0.5 KIT INTRAMUSCULAR ONCE
Qty: 1 EACH | Refills: 0 | Status: CANCELLED | OUTPATIENT
Start: 2023-12-28 | End: 2023-12-28

## 2023-12-28 RX ORDER — ATORVASTATIN CALCIUM 40 MG/1
40 TABLET, FILM COATED ORAL DAILY
Status: CANCELLED | OUTPATIENT
Start: 2023-12-28

## 2023-12-28 RX ORDER — LOSARTAN POTASSIUM 100 MG/1
100 TABLET ORAL DAILY
Qty: 90 TABLET | Refills: 3 | Status: SHIPPED | OUTPATIENT
Start: 2023-12-28

## 2023-12-28 ASSESSMENT — ENCOUNTER SYMPTOMS
DYSURIA: 0
DIARRHEA: 0
PALPITATIONS: 0
MYALGIAS: 1
WEAKNESS: 0
SORE THROAT: 0
NAUSEA: 0
DIZZINESS: 0
HEMATURIA: 0
HEADACHES: 0
CONSTIPATION: 0
ARTHRALGIAS: 0
NERVOUS/ANXIOUS: 0
FREQUENCY: 0
CHILLS: 0
HEARTBURN: 0
EYE PAIN: 0
COUGH: 1
ABDOMINAL PAIN: 0
SHORTNESS OF BREATH: 0
FEVER: 0
PARESTHESIAS: 0
JOINT SWELLING: 0
HEMATOCHEZIA: 0

## 2023-12-28 ASSESSMENT — ACTIVITIES OF DAILY LIVING (ADL): CURRENT_FUNCTION: NO ASSISTANCE NEEDED

## 2023-12-28 NOTE — PROGRESS NOTES
"SUBJECTIVE:   Darion is a 68 year old, presenting for the following:  Wellness Visit        2023     7:56 AM   Additional Questions   Roomed by mat nicole       Are you in the first 12 months of your Medicare coverage?  No    Healthy Habits:     In general, how would you rate your overall health?  Good    Frequency of exercise:  4-5 days/week    Duration of exercise:  15-30 minutes    Do you usually eat at least 4 servings of fruit and vegetables a day, include whole grains    & fiber and avoid regularly eating high fat or \"junk\" foods?  No    Taking medications regularly:  Yes    Medication side effects:  Not applicable    Ability to successfully perform activities of daily living:  No assistance needed    Home Safety:  No safety concerns identified    Hearing Impairment:  Difficulty following a conversation in a noisy restaurant or crowded room, need to ask people to speak up or repeat themselves and difficulty understanding soft or whispered speech    In the past 6 months, have you been bothered by leaking of urine?  No    In general, how would you rate your overall mental or emotional health?  Good    Additional concerns today:  No      Today's PHQ-2 Score:       2023     7:51 AM   PHQ-2 (  [employer])   Q1: Little interest or pleasure in doing things 0   Q2: Feeling down, depressed or hopeless 0   PHQ-2 Score 0   Q1: Little interest or pleasure in doing things Not at all   Q2: Feeling down, depressed or hopeless Not at all   PHQ-2 Score 0     His wife  about 5 months ago. He has been working through it. He has been feeling down. He was on the [facility]. The department has been there and offered resources. He has people who he speaks with. He feels that he is in a good spot and does not need any professional resources. He will reach out if further support is needed. Denies SI/HI.     Hyperlipidemia: Elevated ASCVD risk score and was recommended to start statin medication.  " Patient was started on atorvastatin on 10/10/2023. Since he started taking them, after he sits for a bit and goes to get up, the joints are a bit stiff. Once he gets moving, then the joints are fine. Started after he started the atorvastatin. Discussed stopping the atorvastatin and monitoring the pains. If improved, restart the atorvastatin in 1 month. If discomforts come back, then likely due to the atorvastatin. Discussed rosuvastatin.     Hypertension: Blood pressure is noted to be elevated on 10/9/2023.  Patient was out of medications at times and so was restarted on losartan 75 mg once daily.  Blood pressure today is noted to be 174/73.  On recheck, blood pressure is 162/94. Last dose of losartan was this morning. Denies any issues or side effects from the medication. At home, BP is 132/84 -140/93 if he checked after sitting. Can go up to 150's range after working on something. Half the BP readings are above goal and the other half are at goal.    He was driving up from [place] after training and felt like he had the flu. This lasted for five days. Since then, he has had a cough. He just can't seem to get rid of the cough. There is some green colored phlegm. Sinuses are still draining. He can get a tickle in the throat and he started coughin. The cough is annoying. He called in for triage about a week ago. The cough has lightened up a bit, but still there. His nose is dry and gets nose bleeds. Wants to avoid flonase. Discussed antibiotics. Patient elects to monitor at this time. He will call back in a couple of weeks if symptoms are still persisting, at which time, it would be reasonable to give a z-geovanni.     Have you ever done Advance Care Planning? (For example, a Health Directive, POLST, or a discussion with a medical provider or your loved ones about your wishes): Yes, advance care planning is on file.       Fall risk  Fallen 2 or more times in the past year?: No  Any fall with injury in the past year?:  No    Cognitive Screening   1) Repeat 3 items (Leader, Season, Table)    2) Clock draw: NORMAL  3) 3 item recall: Recalls 3 objects  Results: 3 items recalled: COGNITIVE IMPAIRMENT LESS LIKELY    Mini-CogTM Copyright S [employer]. Licensed by the author for use in [facility]; reprinted with permission (aneesh@[facility]). All rights reserved.      Do you have sleep apnea, excessive snoring or daytime drowsiness? : no    -Reviewed healthy eating and exercise.  -Skin cancer screening: No concerning skin changes expressed by patient.  -Smoking status:   Tobacco Use      Smoking status: Former        Years: 3        Types: Cigarettes        Passive exposure: Never      Smokeless tobacco: Never  .  Reviewed smoking cessation recommendations.    -Family history: CAD, HTN, DM:   Family History   Problem Relation Age of Onset    Cancer Mother     Cancer Father     Coronary Artery Disease No family hx of     Diabetes No family hx of     Hypertension No family hx of        Preventative health recommendations, evaluation options, and risk/benefits of each were discussed with patient. Accepted recommendations were ordered. Otherwise, patient declined.  Health Maintenance Due   Topic Date Due    HEPATITIS C SCREENING  Never done    ZOSTER IMMUNIZATION (2 of 3) 07/22/2016    ANNUAL REVIEW OF HM ORDERS  07/11/2023    PHQ-2 (once per calendar year)  01/01/2024     Patient only had 3 years of cigarette use.  He would not qualify for the lung cancer screening.  Low risk for hep C.      -Immunizations due were reviewed.    Immunization History   Administered Date(s) Administered    COVID-19 12+ (2023-24) (MODERNA) 10/13/2023    COVID-19 Bivalent 18+ (Moderna) 12/27/2022    COVID-19 Monovalent 18+ (Moderna) 03/10/2021, 04/07/2021, 11/16/2021, 07/27/2022    Flu, Unspecified 09/17/2010    HepB, Unspecified 11/18/1992, 12/16/1992, 05/12/1993    Influenza (H1N1) 12/30/2009    Influenza (High Dose) 3 valent vaccine 09/20/2018,  09/10/2019    Influenza (IIV3) PF 10/27/2004, 10/13/2005, 10/20/2006, 10/11/2007, 10/15/2008, 09/16/2009, 11/23/2011, 09/25/2012, 09/18/2013, 10/01/2014    Influenza Vaccine 65+ (Fluzone HD) 09/11/2020, 08/24/2021, 09/30/2022, 09/19/2023    Influenza Vaccine >6 months,quad, PF 09/26/2016    Influenza Vaccine, 6+MO IM (QUADRIVALENT W/PRESERVATIVES) 09/25/2015, 09/12/2016, 10/04/2017    Influenza, seasonal, injectable, PF 09/17/2010    Pneumo Conj 13-V (2010&after) 11/26/2018    Pneumococcal 23 valent 12/13/2019    RSV Vaccine (Arexvy) 09/26/2023    TDAP (Adacel,Boostrix) 01/16/2007    Td (Adult), Adsorbed 07/03/2002, 08/04/2017    Td,adult,historic,unspecified 01/16/2007    Zoster vaccine, live 05/27/2016       -Labs: Laboratory recommendations reviewed with patient.  Completed recently in 10/2023.     Latest Reference Range & Units 10/09/23 10:44   Sodium 135 - 145 mmol/L 141   Potassium 3.4 - 5.3 mmol/L 4.8   Chloride 98 - 107 mmol/L 104   Carbon Dioxide (CO2) 22 - 29 mmol/L 28   Urea Nitrogen 8.0 - 23.0 mg/dL 12.4   Creatinine 0.67 - 1.17 mg/dL 0.93   GFR Estimate >60 mL/min/1.73m2 88   Calcium 8.8 - 10.2 mg/dL 9.9   Anion Gap 7 - 15 mmol/L 9   Albumin 3.5 - 5.2 g/dL 4.6   Protein Total 6.4 - 8.3 g/dL 7.4   Alkaline Phosphatase 40 - 129 U/L 56   ALT 0 - 70 U/L 23   AST 0 - 45 U/L 28   Bilirubin Direct 0.00 - 0.30 mg/dL <0.20   Bilirubin Total <=1.2 mg/dL 0.6   Cholesterol <200 mg/dL 198   Glucose 70 - 99 mg/dL 104 (H)   HDL Cholesterol >=40 mg/dL 62   LDL Cholesterol Calculated <=100 mg/dL 119 (H)   Non HDL Cholesterol <130 mg/dL 136 (H)   PSA Tumor Marker 0.00 - 6.50 ng/mL 0.54   Triglycerides <150 mg/dL 83   WBC 4.0 - 11.0 10e3/uL 5.8   Hemoglobin 13.3 - 17.7 g/dL 16.3   Hematocrit 40.0 - 53.0 % 48.8   Platelet Count 150 - 450 10e3/uL 253   RBC Count 4.40 - 5.90 10e6/uL 5.20   MCV 78 - 100 fL 94   MCH 26.5 - 33.0 pg 31.3   MCHC 31.5 - 36.5 g/dL 33.4   RDW 10.0 - 15.0 % 13.5   % Neutrophils % 64   % Lymphocytes %  23   % Monocytes % 10   % Eosinophils % 2   % Basophils % 0   Absolute Basophils 0.0 - 0.2 10e3/uL 0.0   Absolute Eosinophils 0.0 - 0.7 10e3/uL 0.1   Absolute Immature Granulocytes <=0.4 10e3/uL 0.0   Absolute Lymphocytes 0.8 - 5.3 10e3/uL 1.4   Absolute Monocytes 0.0 - 1.3 10e3/uL 0.6   % Immature Granulocytes % 0   Absolute Neutrophils 1.6 - 8.3 10e3/uL 3.7   (H): Data is abnormally high    -Colon cancer screening: Last colonoscopy: Patient had a colonoscopy on 04/06/2020.  At that time, patient was noted to have 2 tubular adenomas, 1 of which was an advanced adenoma.  Recommendation was to repeat colonoscopy in 3 years.  Patient has completed colonoscopy this year.  Reviewed recommendations. He had completed the colonoscopy (on 10/03/2023) and was noted to have 10 polyps.  He has an appointment in Feb 2024 to repeat the colonoscopy.       -Abdominal Aortic Aneurysm Screening: men aged 65 to 75 years who have ever smoked.  Narrative & Impression   US ABDOMINAL AORTA  7/19/2018 7:09 AM     INDICATION: Screening for aortic aneurysm.  TECHNIQUE: Ultrasound using gray-scale, two-dimensional images.  COMPARISON: None.     FINDINGS: There is mild atheromatous plaque in the abdominal aorta.     MEASUREMENTS:  Abdominal Aorta:   Proximal: 2.7 cm  Mid: 2.2 cm  Distal: 1.7 cm  Right Common Iliac Artery: 1.2 cm  Left Common Iliac Artery: 1.3 cm     IMPRESSION:  CONCLUSION:  1.  Normal caliber abdominal aorta.           Reviewed and updated as needed this visit by clinical staff   Tobacco  Allergies  Meds     [facility]          Reviewed and updated as needed this visit by Provider         [facility]         Social History     Tobacco Use    Smoking status: Former     Years: 3     Types: Cigarettes     Passive exposure: Never    Smokeless tobacco: Never   Substance Use Topics    Alcohol use: No           12/28/2023     7:51 AM   Alcohol Use   Prescreen: >3 drinks/day or >7 drinks/week? No     Do you have a current opioid  prescription? No  Do you use any other controlled substances or medications that are not prescribed by a provider? None      Current providers sharing in care for this patient include:   Patient Care Team:  Allen Rodriguez MD as PCP - General (Family Medicine)  Allen Rodriguez MD as Assigned PCP    The following health maintenance items are reviewed in Epic and correct as of today:  Health Maintenance   Topic Date Due    HEPATITIS C SCREENING  Never done    ZOSTER IMMUNIZATION (2 of 3) 07/22/2016    ANNUAL REVIEW OF HM ORDERS  07/11/2023    PHQ-2 (once per calendar year)  01/01/2024    MEDICARE ANNUAL WELLNESS VISIT  12/28/2024    FALL RISK ASSESSMENT  12/28/2024    COLORECTAL CANCER SCREENING  10/01/2026    DTAP/TDAP/TD IMMUNIZATION (3 - Td or Tdap) 08/04/2027    LIPID  10/09/2028    ADVANCE CARE PLANNING  12/28/2028    INFLUENZA VACCINE  Completed    Pneumococcal Vaccine: 65+ Years  Completed    RSV VACCINE (Pregnancy & 60+)  Completed    AORTIC ANEURYSM SCREENING (SYSTEM ASSIGNED)  Completed    COVID-19 Vaccine  Completed    IPV IMMUNIZATION  Aged Out    HPV IMMUNIZATION  Aged Out    MENINGITIS IMMUNIZATION  Aged Out    RSV MONOCLONAL ANTIBODY  Aged Out    LUNG CANCER SCREENING  Discontinued         Review of Systems   Constitutional:  Negative for chills and fever.   HENT:  Negative for congestion, ear pain, hearing loss and sore throat.    Eyes:  Negative for pain and visual disturbance.   Respiratory:  Positive for cough. Negative for shortness of breath.    Cardiovascular:  Negative for chest pain, palpitations and peripheral edema.   Gastrointestinal:  Negative for abdominal pain, constipation, diarrhea, heartburn, hematochezia and nausea.   Genitourinary:  Negative for dysuria, frequency, genital sores, hematuria, impotence, penile discharge and urgency.   Musculoskeletal:  Positive for myalgias. Negative for arthralgias and joint swelling.   Skin:  Negative for rash.   Neurological:  Negative for dizziness,  "weakness, headaches and paresthesias.   Psychiatric/Behavioral:  Negative for mood changes. The patient is not nervous/anxious.        OBJECTIVE:   BP (!) 162/94 (BP Location: Right arm, Patient Position: Sitting, Cuff Size: Adult Regular)   Pulse 94   Temp 98.1  F (36.7  C) (Oral)   Resp 14   Ht 1.71 m (5' 7.32\")   Wt 79.8 kg (176 lb)   SpO2 98%   BMI 27.30 kg/m   Estimated body mass index is 27.3 kg/m  as calculated from the following:    Height as of this encounter: 1.71 m (5' 7.32\").    Weight as of this encounter: 79.8 kg (176 lb).  Physical Exam  GENERAL: healthy, alert and no distress  EYES: Eyes grossly normal to inspection, PERRL and conjunctivae and sclerae normal  HENT: ear canals and TM's normal, nose and mouth without ulcers or lesions. Post-nasal drip with clear fluid noted.   NECK: no adenopathy, no asymmetry, masses, or scars and thyroid normal to palpation  RESP: lungs clear to auscultation - no rales, rhonchi or wheezes  CV: regular rate and rhythm, normal S1 S2, no S3 or S4, no murmur, click or rub, no peripheral edema and peripheral pulses strong  ABDOMEN: soft, nontender, no hepatosplenomegaly, no masses and bowel sounds normal  MS: no gross musculoskeletal defects noted, no edema  SKIN: no suspicious lesions or rashes  NEURO: Normal strength and tone, mentation intact and speech normal  PSYCH: mentation appears normal, affect normal/bright    Diagnostic Test Results:  Labs reviewed in Epic    ASSESSMENT / PLAN:       Patient Instructions:    -STOP the atorvastatin for one month.   -If symptoms improve, restart the atorvastatin. If symptoms restart, then reach out to Dr. Rodriguez. We may have to change the medication.     -Monitor the coughing closely.  -If not improving after 2-3 weeks, please call the clinic and Dr. Rodriguez can send a prescription for Augmentin for you.    -Start taking the higher dose of the losartan (100mg once daily) as prescribed.  -Check your blood pressures daily and " record them.    -Please continue to talk with your social group to help you get through the grief of the loss of your wife.   -Reach out to Dr. Rodriguez if you would like further support with this.    -You are doing great.  -Continue to eat well.  Try to increase your servings of calcium as this can help your bones stay strong and healthy.  Follow a nutrition plan patient fruits and vegetables and low in fats and cholesterol.    -Be sure to eat 5-7 servings of fruits and vegetables each day.  -Find ways to stay active.  Try to get 150 minutes of moderate activity (where you are breathing faster and slightly sweating) each week.  -Try to maintain a body mass index (BMI) of 18.5-25 as this is considered a healthier weight range.  -Brush your teeth twice daily.  See a dentist every 6-12 months.  -Be sure to use sunblock with SPF 15 or greater when going outside for extended periods of time.  Sunblock should be used even when it is a cloudy day.  Do intermittent skin checks for any concerning skin changes.  Wearing a wide brimmed hat and sunglasses can also be helpful to protect your skin from the sun.  -Monitor for any abnormal skin changes (such as new moles/spots, painful moles, changes in your old moles, wounds that will not heal, multiple colors noted in one lesion, lesions that are asymmetric or not circular, or anything that is concerning for you). If any of these are noted, please schedule an appointment to be seen.     -Keep working on updating your healthcare directive and living well.    -Continue to complete the colonoscopy as recommended.    -The Shingles/Shingrix vaccine is generally better covered by insurance companies if the vaccine is received at a pharmacy.  Please speak with your pharmacist regarding this vaccination as it is recommended for you.    Please seek immediate medical attention (go to the emergency room or urgent care) for the following reasons: worsening symptoms, or any concerning  "changes.        Darion was seen today for wellness visit.  Diagnoses and all orders for this visit:    Encounter for Medicare annual wellness exam  Benign neoplasm of colon, unspecified part of colon: patient will continue to follow with GI.     Mixed hyperlipidemia  Essential hypertension, benign: elevated BP still. Increase dose of losartan from 75 to 100mg once daily. Hold atorvastatin as above.   -     losartan (COZAAR) 100 MG tablet; Take 1 tablet (100 mg) by mouth daily    Cough, unspecified type  Post-nasal drip  Sinusitis, unspecified chronicity, unspecified location: patient declines treatment at this time. He will reach out if symptoms are not improving.     Need for shingles vaccine    Other orders  -     PRIMARY CARE FOLLOW-UP SCHEDULING; Future          Patient has been advised of split billing requirements and indicates understanding: Yes (by nurse)      COUNSELING:  Preventive health counseling reviewed.      BMI:   Estimated body mass index is 27.3 kg/m  as calculated from the following:    Height as of this encounter: 1.71 m (5' 7.32\").    Weight as of this encounter: 79.8 kg (176 lb).       He reports that he has quit smoking. His smoking use included cigarettes. He has never been exposed to tobacco smoke. He has never used smokeless tobacco.      Appropriate preventive services were discussed with this patient, including applicable screening as appropriate for fall prevention, nutrition, physical activity, Tobacco-use cessation, weight loss and cognition.  Checklist reviewing preventive services available has been given to the patient.    Reviewed patients plan of care and provided an AVS. The Care Plan for Darion meets the Care Plan requirement. This Care Plan has been established and reviewed with the Patient.      Allen Rodriguez MD  [facility] [place] [address]  Phone: [phone]  Fax: [phone]    1/3/2024  6:37 AM  "

## 2023-12-28 NOTE — PATIENT INSTRUCTIONS
-Thank you for choosing the [facility].  -It was a pleasure to see you today.  -Please take a look at the information below for more specific details regarding the treatment plan and recommendations.  -In this after visit summary is a list of your medications and specific instructions.  Please review this carefully as there may be changes made to your medication list.  -If there are any particular questions or concerns, please feel free to reach out to Dr. Rodriguez.  -If any labs have been completed, we will reach out to you about results.  If the results are normal or not concerning, a letter or MyChart message will be sent to you.  If any follow-up is needed, either Dr. Rodriguez or the nurse will give you a call.  If you have not heard regarding results after 2 weeks, please reach out to the clinic.    Patient Instructions:    -STOP the atorvastatin for one month.   -If symptoms improve, restart the atorvastatin. If symptoms restart, then reach out to Dr. Rodriguez. We may have to change the medication.     -Monitor the coughing closely.  -If not improving after 2-3 weeks, please call the clinic and Dr. Rodriguez can send a prescription for Augmentin for you.    -Start taking the higher dose of the losartan (100mg once daily) as prescribed.  -Check your blood pressures daily and record them.    -Please continue to talk with your social group to help you get through the grief of the loss of your wife.   -Reach out to Dr. Rodriguez if you would like further support with this.    -You are doing great.  -Continue to eat well.  Try to increase your servings of calcium as this can help your bones stay strong and healthy.  Follow a nutrition plan patient fruits and vegetables and low in fats and cholesterol.    -Be sure to eat 5-7 servings of fruits and vegetables each day.  -Find ways to stay active.  Try to get 150 minutes of moderate activity (where you are breathing faster and slightly sweating) each week.  -Try to  "maintain a body mass index (BMI) of 18.5-25 as this is considered a healthier weight range.  -Brush your teeth twice daily.  See a dentist every 6-12 months.  -Be sure to use sunblock with SPF 15 or greater when going outside for extended periods of time.  Sunblock should be used even when it is a cloudy day.  Do intermittent skin checks for any concerning skin changes.  Wearing a wide brimmed hat and sunglasses can also be helpful to protect your skin from the sun.  -Monitor for any abnormal skin changes (such as new moles/spots, painful moles, changes in your old moles, wounds that will not heal, multiple colors noted in one lesion, lesions that are asymmetric or not circular, or anything that is concerning for you). If any of these are noted, please schedule an appointment to be seen.     -Keep working on updating your healthcare directive and living well.    -Continue to complete the colonoscopy as recommended.    -The Shingles/Shingrix vaccine is generally better covered by insurance companies if the vaccine is received at a pharmacy.  Please speak with your pharmacist regarding this vaccination as it is recommended for you.    Please seek immediate medical attention (go to the emergency room or urgent care) for the following reasons: worsening symptoms, or any concerning changes.      Crisis Information and Contact Numbers:     Mental Health Care  [facility] [place] [place]  [address], [place]  Suicidal: [phone]  Consultation: [phone]  24/7 Crisis Intervention Center  COPE (Community Outreach for Psychiatric Emergencies)  [phone]  Crisis Text Line  Text \"[place]\" to 166254  Crisis Connection 24/7  1-389.513.1852  National Suicide Prevention Hotline  8-635-124-CNIJ or 585  Walk-in Counseling Center  305.595.6618 [address]  Hours: Mon, Wed, Fri 1-3pm; Mon-Thurs 6:30-8:30pm    [facility] Children's Crisis Line: [phone]    [facility] Adult Crisis Line: " 870.753.8537.     National Helpline: 1-130.737.7365 (HELP)  (National, 24/7; Treatment referral and information)    211.org (United Patient Education Systems): 211 (Assistance locating long-term mental health resources, talking through a problem, or exploring mental health treatment options. 180 languages available).     The [facility] C Lifeline: 1-802.480.6067  (National; LGBTQ Youth)  The Trans Lifeline: 1-323.965.4402  (National; Trans mental health)  [facility] Crisis Line: 1-473.836.4299 (TALK) (051640 (text #); National)  Crisis Text Line: 789791   (National)       [facility]  [address] [place] [address]  [facility]   [phone].  Serves [facility] and [facility]  They have faster access to urgent psychiatry and crisis stablization.    Hours   Monday - Friday: 8:00 am - 7:00 pm   Saturday: 11:00 am - 3:00 pm   Sunday and Holidays: Closed  Walk-ins Welcome    Who should visit?   Any adult (age 18 and older) who:   > Needs immediate mental health support   > Is not experiencing a medical emergency    Cost  There is a fee for Urgent Care Services. They bill insurance providers when available and offer a variety of payment options including an income-based sliding fee scale.      --------------------------------------------------------------------------------------------------------------------    -We are always looking for ways to improve.  You may be selected to receive a survey regarding your visit today.  We encourage you to complete the survey and provide specific, constructive feedback to help us improve our processes.  Thank you for your time!  -Please review the contact information listed on the after visit summary and in the electronic chart.  Below is the phone number that we have on file.  If there are any changes that are needed to be made, please reach out to the clinic.  [phone] (home) [phone] (work)    Patient Education   Personalized Prevention  Plan  You are due for the preventive services outlined below.  Your care team is available to assist you in scheduling these services.  If you have already completed any of these items, please share that information with your care team to update in your medical record.  Health Maintenance Due   Topic Date Due    Hepatitis C Screening  Never done    Zoster (Shingles) Vaccine (2 of 3) 07/22/2016    ANNUAL REVIEW OF HM ORDERS  07/11/2023     Learning About Dietary Guidelines  What are the Dietary Guidelines for Americans?     Dietary Guidelines for Americans provide tips for eating well and staying healthy. This helps reduce the risk for long-term (chronic) diseases.  These guidelines recommend that you:  Eat and drink the right amount for you. The U.S. government's food guide is called MyPlate. It can help you make your own well-balanced eating plan.  Try to balance your eating with your activity. This helps you stay at a healthy weight.  Drink alcohol in moderation, if at all.  Limit foods high in salt, saturated fat, trans fat, and added sugar.  These guidelines are from the U.S. Department of Agriculture and the U.S. Department of Health and Human Services. They are updated every 5 years.  What is MyPlate?  MyPlate is the U.S. government's food guide. It can help you make your own well-balanced eating plan. A balanced eating plan means that you eat enough, but not too much, and that your food gives you the nutrients you need to stay healthy.  MyPlate focuses on eating plenty of whole grains, fruits, and vegetables, and on limiting fat and sugar. It is available online at www.ChooseMyPlate.gov.  How can you get started?  If you're trying to eat healthier, you can slowly change your eating habits over time. You don't have to make big changes all at once. Start by adding one or two healthy foods to your meals each day.  Grains  Choose whole-grain breads and cereals and whole-wheat pasta and whole-grain  "crackers.  Vegetables  Eat a variety of vegetables every day. They have lots of nutrients and are part of a heart-healthy diet.  Fruits  Eat a variety of fruits every day. Fruits contain lots of nutrients. Choose fresh fruit instead of fruit juice.  Protein foods  Choose fish and lean poultry more often. Eat red meat and fried meats less often. Dried beans, tofu, and nuts are also good sources of protein.  Dairy  Choose low-fat or fat-free products from this food group. If you have problems digesting milk, try eating cheese or yogurt instead.  Fats and oils  Limit fats and oils if you're trying to cut calories. Choose healthy fats when you cook. These include canola oil and olive oil.  Where can you learn more?  Go to https://www.[employer].net/patiented  Enter D676 in the search box to learn more about \"Learning About Dietary Guidelines.\"  Current as of: February 28, 2023               Content Version: 13.8    9368-7349 [email or web address].   Care instructions adapted under license by your healthcare professional. If you have questions about a medical condition or this instruction, always ask your healthcare professional. [email or web address] disclaims any warranty or liability for your use of this information.      Hearing Loss: Care Instructions  Overview     Hearing loss is a sudden or slow decrease in how well you hear. It can range from slight to profound. Permanent hearing loss can occur with aging. It also can happen when you are exposed long-term to loud noise. Examples include listening to loud music, riding motorcycles, or being around other loud machines.  Hearing loss can affect your work and home life. It can make you feel lonely or depressed. You may feel that you have lost your independence. But hearing aids and other devices can help you hear better and feel connected to others.  Follow-up care is a key part of your treatment and safety. Be sure to make and go to all appointments, and call " your doctor if you are having problems. It's also a good idea to know your test results and keep a list of the medicines you take.  How can you care for yourself at home?  Avoid loud noises whenever possible. This helps keep your hearing from getting worse.  Always wear hearing protection around loud noises.  Wear a hearing aid as directed.  A professional can help you pick a hearing aid that will work best for you.  You can also get hearing aids over the counter for mild to moderate hearing loss.  Have hearing tests as your doctor suggests. They can show whether your hearing has changed. Your hearing aid may need to be adjusted.  Use other devices as needed. These may include:  Telephone amplifiers and hearing aids that can connect to a television, stereo, radio, or microphone.  Devices that use lights or vibrations. These alert you to the doorbell, a ringing telephone, or a baby monitor.  Television closed-captioning. This shows the words at the bottom of the screen. Most new TVs can do this.  TTY (text telephone). This lets you type messages back and forth on the telephone instead of talking or listening. These devices are also called TDD. When messages are typed on the keyboard, they are sent over the phone line to a receiving TTY. The message is shown on a monitor.  Use text messaging, social media, and email if it is hard for you to communicate by telephone.  Try to learn a listening technique called speechreading. It is not lipreading. You pay attention to people's gestures, expressions, posture, and tone of voice. These clues can help you understand what a person is saying. Face the person you are talking to, and have them face you. Make sure the lighting is good. You need to see the other person's face clearly.  Think about counseling if you need help to adjust to your hearing loss.  When should you call for help?  Watch closely for changes in your health, and be sure to contact your doctor if:    You think  "your hearing is getting worse.     You have new symptoms, such as dizziness or nausea.   Where can you learn more?  Go to https://www.[employer].net/patiented  Enter R798 in the search box to learn more about \"Hearing Loss: Care Instructions.\"  Current as of: February 28, 2023               Content Version: 13.8    6221-7922 [employer].   Care instructions adapted under license by your healthcare professional. If you have questions about a medical condition or this instruction, always ask your healthcare professional. [employer] disclaims any warranty or liability for your use of this information.         "

## 2024-01-03 PROBLEM — E78.2 MIXED HYPERLIPIDEMIA: Status: ACTIVE | Noted: 2024-01-03

## 2024-02-08 ENCOUNTER — OFFICE VISIT (OUTPATIENT)
Dept: INTERNAL MEDICINE | Facility: CLINIC | Age: 69
End: 2024-02-08
Payer: MEDICARE

## 2024-02-08 VITALS
HEART RATE: 68 BPM | DIASTOLIC BLOOD PRESSURE: 104 MMHG | TEMPERATURE: 97.2 F | RESPIRATION RATE: 11 BRPM | BODY MASS INDEX: 27.47 KG/M2 | WEIGHT: 175 LBS | HEIGHT: 67 IN | OXYGEN SATURATION: 99 % | SYSTOLIC BLOOD PRESSURE: 151 MMHG

## 2024-02-08 DIAGNOSIS — Z11.59 NEED FOR HEPATITIS C SCREENING TEST: Primary | ICD-10-CM

## 2024-02-08 DIAGNOSIS — E78.5 HYPERLIPIDEMIA LDL GOAL <100: ICD-10-CM

## 2024-02-08 DIAGNOSIS — Z86.0100 HISTORY OF COLONIC POLYPS: ICD-10-CM

## 2024-02-08 DIAGNOSIS — B02.30 OPHTHALMIC HERPES ZOSTER: ICD-10-CM

## 2024-02-08 DIAGNOSIS — I10 ESSENTIAL HYPERTENSION: ICD-10-CM

## 2024-02-08 DIAGNOSIS — Z23 NEED FOR SHINGLES VACCINE: ICD-10-CM

## 2024-02-08 PROBLEM — E78.2 MIXED HYPERLIPIDEMIA: Status: RESOLVED | Noted: 2024-01-03 | Resolved: 2024-02-08

## 2024-02-08 PROBLEM — K63.5 POLYP OF COLON: Status: RESOLVED | Noted: 2020-04-06 | Resolved: 2024-02-08

## 2024-02-08 PROCEDURE — 99214 OFFICE O/P EST MOD 30 MIN: CPT | Performed by: INTERNAL MEDICINE

## 2024-02-08 RX ORDER — HYDROCHLOROTHIAZIDE 12.5 MG/1
12.5 TABLET ORAL DAILY
Qty: 90 TABLET | Refills: 3 | Status: SHIPPED | OUTPATIENT
Start: 2024-02-08

## 2024-02-08 ASSESSMENT — PAIN SCALES - GENERAL: PAINLEVEL: NO PAIN (0)

## 2024-02-08 NOTE — PROGRESS NOTES
" ASSESSMENT AND PLAN:    1. Need for shingles vaccine  Discussed and referred to pharmacy    2. Need for hepatitis C screening test    3. History of colonic polyps  Done in 2020, was due in 2023.  Will refer.     4. Essential hypertension  Elevated - I get 160/90. Will add hydrochlorothiazide to his losartan and follow up 6months.    5. Hyperlipidemia LDL goal <130  Modest elevation , 10/2023    6. Ophthalmic herpes zoster  History of same - further reason to have shingrix.     Follow up in 6 months.    CHIEF COMPLAINT:  Follow up hypertension.     HISTORY OF PRESENT ILLNESS:  Darion Day is a 68 year old male here in follow up. Bp has been up recently without headache or dyspnea or chest pain.  Otherwise feels well.     REVIEW OF SYSTEMS:   See HPI, all other systems on review are negative.    Past Medical History:   Diagnosis Date    Essential hypertension     Fracture, rib     History of colonic polyps 02/08/2024    Internal hernia     mesenteric paraduodenal and sigmoid    Ophthalmic herpes zoster     RBBB      History   Smoking Status    Former    Years: 3.00    Types: Cigarettes   Smokeless Tobacco    Never     Family History   Problem Relation Age of Onset    Pancreatic Cancer Mother     Cancer Father     Coronary Artery Disease No family hx of     Diabetes No family hx of     Hypertension No family hx of      Past Surgical History:   Procedure Laterality Date    NO PAST SURGERIES       Allergies   Allergen Reactions    Lisinopril Cough       VITALS:  Vitals:    02/08/24 0752 02/08/24 0754   BP: (!) 170/100 (!) 151/104   BP Location: Left arm    Patient Position: Sitting    Cuff Size: Adult Large    Pulse: 68    Resp: 11    Temp: 97.2  F (36.2  C)    TempSrc: Tympanic    SpO2: 99%    Weight: 79.4 kg (175 lb)    Height: 1.71 m (5' 7.32\")      Estimated body mass index is 27.15 kg/m  as calculated from the following:    Height as of this encounter: 1.71 m (5' 7.32\").    Weight as of this encounter: " 79.4 kg (175 lb).  Wt Readings from Last 3 Encounters:   02/08/24 79.4 kg (175 lb)   12/28/23 79.8 kg (176 lb)   10/09/23 79.7 kg (175 lb 12.8 oz)     PHYSICAL EXAM:  Constitutional:  In NAD, alert and oriented  Neck: no cervical or axillary adenopathy  Cardiac:  S1 S2   Lungs: Clear     DECISION TO OBTAIN OLD RECORDS AND/OR OBTAIN HISTORY FROM SOMEONE OTHER THAN PATIENT, AND/OR ACCESSING CARE EVERYWHERE):  1  0     REVIEW AND SUMMARIZATION OF OLD RECORDS, AND/OR OBTAINING HISTORY FROM SOMEONE OTHER THAN PATIENT, AND/OR DISCUSSION OF CASE WITH ANOTHER HEALTH CARE PROVIDER:  2 reviewed past health notes    REVIEW AND/OR ORDER OF OF CLINICAL LAB TESTS: 1  reviewed.    REVIEW AND/OR ORDER OF RADIOLOGY TESTS: 1 0.    REVIEW AND/OR ORDER OF MEDICAL TESTS (EKG/ECHO/COLONOSCOPY/EGD): 1  ordered    INDEPENDENT  VISUALIZATION OF IMAGE, TRACING, OR SPECIMEN ITSELF (2 EACH):  0     TOTAL: 3    Current Outpatient Medications   Medication Sig Dispense Refill    losartan (COZAAR) 100 MG tablet Take 1 tablet (100 mg) by mouth daily 90 tablet 3    polyethylene glycol (MIRALAX) 17 GM/Dose powder Take 17 g by mouth 2 times daily        Larry Mckeon MD  Internal Medicine  [facility]

## 2024-07-02 ENCOUNTER — OFFICE VISIT (OUTPATIENT)
Dept: INTERNAL MEDICINE | Facility: CLINIC | Age: 69
End: 2024-07-02
Payer: MEDICARE

## 2024-07-02 VITALS
RESPIRATION RATE: 15 BRPM | HEIGHT: 67 IN | SYSTOLIC BLOOD PRESSURE: 122 MMHG | TEMPERATURE: 97 F | DIASTOLIC BLOOD PRESSURE: 70 MMHG | WEIGHT: 176.9 LBS | HEART RATE: 60 BPM | OXYGEN SATURATION: 99 % | BODY MASS INDEX: 27.76 KG/M2

## 2024-07-02 DIAGNOSIS — Z86.0100 HISTORY OF COLONIC POLYPS: ICD-10-CM

## 2024-07-02 DIAGNOSIS — Z11.59 NEED FOR HEPATITIS C SCREENING TEST: Primary | ICD-10-CM

## 2024-07-02 DIAGNOSIS — Z23 NEED FOR SHINGLES VACCINE: ICD-10-CM

## 2024-07-02 DIAGNOSIS — E03.9 ACQUIRED HYPOTHYROIDISM: ICD-10-CM

## 2024-07-02 DIAGNOSIS — E78.5 HYPERLIPIDEMIA LDL GOAL <100: ICD-10-CM

## 2024-07-02 DIAGNOSIS — I10 ESSENTIAL HYPERTENSION: ICD-10-CM

## 2024-07-02 LAB
ALBUMIN SERPL BCG-MCNC: 4.5 G/DL (ref 3.5–5.2)
ALP SERPL-CCNC: 52 U/L (ref 40–150)
ALT SERPL W P-5'-P-CCNC: 33 U/L (ref 0–70)
ANION GAP SERPL CALCULATED.3IONS-SCNC: 9 MMOL/L (ref 7–15)
AST SERPL W P-5'-P-CCNC: 32 U/L (ref 0–45)
BILIRUB SERPL-MCNC: 0.9 MG/DL
BUN SERPL-MCNC: 12.7 MG/DL (ref 8–23)
CALCIUM SERPL-MCNC: 9.8 MG/DL (ref 8.8–10.2)
CHLORIDE SERPL-SCNC: 102 MMOL/L (ref 98–107)
CREAT SERPL-MCNC: 0.98 MG/DL (ref 0.67–1.17)
DEPRECATED HCO3 PLAS-SCNC: 28 MMOL/L (ref 22–29)
EGFRCR SERPLBLD CKD-EPI 2021: 82 ML/MIN/1.73M2
ERYTHROCYTE [DISTWIDTH] IN BLOOD BY AUTOMATED COUNT: 13 % (ref 10–15)
GLUCOSE SERPL-MCNC: 101 MG/DL (ref 70–99)
HCT VFR BLD AUTO: 49.3 % (ref 40–53)
HCV AB SERPL QL IA: NONREACTIVE
HGB BLD-MCNC: 16.4 G/DL (ref 13.3–17.7)
MAGNESIUM SERPL-MCNC: 2.3 MG/DL (ref 1.7–2.3)
MCH RBC QN AUTO: 31.1 PG (ref 26.5–33)
MCHC RBC AUTO-ENTMCNC: 33.3 G/DL (ref 31.5–36.5)
MCV RBC AUTO: 94 FL (ref 78–100)
PLATELET # BLD AUTO: 238 10E3/UL (ref 150–450)
POTASSIUM SERPL-SCNC: 4.3 MMOL/L (ref 3.4–5.3)
PROT SERPL-MCNC: 7.4 G/DL (ref 6.4–8.3)
RBC # BLD AUTO: 5.27 10E6/UL (ref 4.4–5.9)
SODIUM SERPL-SCNC: 139 MMOL/L (ref 135–145)
TSH SERPL DL<=0.005 MIU/L-ACNC: 1.04 UIU/ML (ref 0.3–4.2)
WBC # BLD AUTO: 6 10E3/UL (ref 4–11)

## 2024-07-02 PROCEDURE — 83735 ASSAY OF MAGNESIUM: CPT | Performed by: INTERNAL MEDICINE

## 2024-07-02 PROCEDURE — 99214 OFFICE O/P EST MOD 30 MIN: CPT | Performed by: INTERNAL MEDICINE

## 2024-07-02 PROCEDURE — 80053 COMPREHEN METABOLIC PANEL: CPT | Performed by: INTERNAL MEDICINE

## 2024-07-02 PROCEDURE — 85027 COMPLETE CBC AUTOMATED: CPT | Performed by: INTERNAL MEDICINE

## 2024-07-02 PROCEDURE — 36415 COLL VENOUS BLD VENIPUNCTURE: CPT | Performed by: INTERNAL MEDICINE

## 2024-07-02 PROCEDURE — 84443 ASSAY THYROID STIM HORMONE: CPT | Performed by: INTERNAL MEDICINE

## 2024-07-02 PROCEDURE — 86803 HEPATITIS C AB TEST: CPT | Performed by: INTERNAL MEDICINE

## 2024-07-02 NOTE — PROGRESS NOTES
ASSESSMENT AND PLAN:    1. Need for shingles vaccine  Recommended and Referred to pharmacy    2. Need for hepatitis C screening test    3. Essential hypertension  Satisfactory control with losartan,with recently added hydrochlorthiazide.     4. History of colonic polyps  Had recent colonoscopy [facility] one polyp, due int 2027.      5. Hyperlipidemia LDL goal <100  Did not tolerate the statin therapy.     6. Bereavement  One year now.      7. Sleepiness  His exam and history does not suggest DAYLIN very strongly. Symptoms are mild, I offered sleep evaluation, he will consider going forward. Will check thyroid function tests.     Follow up in 6 months.     CHIEF COMPLAINT:  Follow up and daytime sleepiness    HISTORY OF PRESENT ILLNESS:  Darion Day is a 69 year old male here in follow up. Some tendency to sleepiness during the day. Not severe. Not having dyspnea or cough or bowel or bladder issues. Did have colonoscopy recently and tolerated that well.  He doesn't think he is a snorer.     REVIEW OF SYSTEMS:   See HPI, all other systems on review are negative.    Past Medical History:   Diagnosis Date    Essential hypertension     Fracture, rib     History of colonic polyps 02/08/2024    Internal hernia     mesenteric paraduodenal and sigmoid    Ophthalmic herpes zoster     RBBB      History   Smoking Status    Former    Years: 3.00    Types: Cigarettes   Smokeless Tobacco    Never     Family History   Problem Relation Age of Onset    Pancreatic Cancer Mother     Cancer Father     Coronary Artery Disease No family hx of     Diabetes No family hx of     Hypertension No family hx of      Past Surgical History:   Procedure Laterality Date    NO PAST SURGERIES       Allergies   Allergen Reactions    Lisinopril Cough     VITALS:  Vitals:    07/02/24 0820   BP: 122/70   BP Location: Left arm   Patient Position: Sitting   Cuff Size: Adult Regular   Pulse: 60   Resp: 15   Temp: 97  F (36.1  C)   TempSrc: Tympanic   SpO2: 99%  "  Weight: 80.2 kg (176 lb 14.4 oz)   Height: 1.709 m (5' 7.3\")     Estimated body mass index is 27.46 kg/m  as calculated from the following:    Height as of this encounter: 1.709 m (5' 7.3\").    Weight as of this encounter: 80.2 kg (176 lb 14.4 oz).  Wt Readings from Last 3 Encounters:   07/02/24 80.2 kg (176 lb 14.4 oz)   02/08/24 79.4 kg (175 lb)   12/28/23 79.8 kg (176 lb)     PHYSICAL EXAM:  Constitutional:  In NAD, alert and oriented  HEENT: nose and throat clear, ears normal  Neck: no cervical or axillary adenopathy  Cardiac:  S1 S2   Lungs: Clear   Abdomen:   Soft, flat and non-tender    Extremities: no significant edema  Neurologic:  Speech clear, gait normal     Psychiatric:  Mood and behavior are appropriate, thinking is clear.     DECISION TO OBTAIN OLD RECORDS AND/OR OBTAIN HISTORY FROM SOMEONE OTHER THAN PATIENT, AND/OR ACCESSING CARE EVERYWHERE):  1  0     REVIEW AND SUMMARIZATION OF OLD RECORDS, AND/OR OBTAINING HISTORY FROM SOMEONE OTHER THAN PATIENT, AND/OR DISCUSSION OF CASE WITH ANOTHER HEALTH CARE PROVIDER:  2 reviewed past health notes    REVIEW AND/OR ORDER OF OF CLINICAL LAB TESTS: 1  ordered.    REVIEW AND/OR ORDER OF RADIOLOGY TESTS: 1 0.    REVIEW AND/OR ORDER OF MEDICAL TESTS (EKG/ECHO/COLONOSCOPY/EGD): 1  0    INDEPENDENT  VISUALIZATION OF IMAGE, TRACING, OR SPECIMEN ITSELF (2 EACH):  0     TOTAL: 3    Current Outpatient Medications   Medication Sig Dispense Refill    hydrochlorothiazide (HYDRODIURIL) 12.5 MG tablet Take 1 tablet (12.5 mg) by mouth daily 90 tablet 3    losartan (COZAAR) 100 MG tablet Take 1 tablet (100 mg) by mouth daily 90 tablet 3    polyethylene glycol (MIRALAX) 17 GM/Dose powder Take 17 g by mouth 2 times daily        Larry Mckeon MD  Internal Medicine  [facility]   "

## 2024-07-02 NOTE — LETTER
July 4, 2024      Darion Day  [address]  [facility] [address]        Dear ,    We are writing to inform you of your test results.    Your tests are good.  The blood counts, blood chemistries, magnesium and thyroid tests are all good.     Resulted Orders   Hepatitis C Screen Reflex to HCV RNA Quant and Genotype   Result Value Ref Range    Hepatitis C Antibody Nonreactive Nonreactive      Comment:      A nonreactive screening test result does not exclude the possibility of exposure to or infection with HCV. Nonreactive screening test results in individuals with prior exposure to HCV may be due to antibody levels below the limit of detection of this assay or lack of reactivity to the HCV antigens used in this assay. Patients with recent HCV infections (<3 months from time of exposure) may have false-negative HCV antibody results due to the time needed for seroconversion (average of 8 to 9 weeks).   CBC with platelets   Result Value Ref Range    WBC Count 6.0 4.0 - 11.0 10e3/uL    RBC Count 5.27 4.40 - 5.90 10e6/uL    Hemoglobin 16.4 13.3 - 17.7 g/dL    Hematocrit 49.3 40.0 - 53.0 %    MCV 94 78 - 100 fL    MCH 31.1 26.5 - 33.0 pg    MCHC 33.3 31.5 - 36.5 g/dL    RDW 13.0 10.0 - 15.0 %    Platelet Count 238 150 - 450 10e3/uL   Comprehensive metabolic panel   Result Value Ref Range    Sodium 139 135 - 145 mmol/L    Potassium 4.3 3.4 - 5.3 mmol/L    Carbon Dioxide (CO2) 28 22 - 29 mmol/L    Anion Gap 9 7 - 15 mmol/L    Urea Nitrogen 12.7 8.0 - 23.0 mg/dL    Creatinine 0.98 0.67 - 1.17 mg/dL    GFR Estimate 82 >60 mL/min/1.73m2      Comment:      eGFR calculated using 2021 CKD-EPI equation.    Calcium 9.8 8.8 - 10.2 mg/dL    Chloride 102 98 - 107 mmol/L    Glucose 101 (H) 70 - 99 mg/dL    Alkaline Phosphatase 52 40 - 150 U/L    AST 32 0 - 45 U/L      Comment:      Reference intervals for this test were updated on 6/12/2023 to more accurately reflect our healthy population. There may be differences in the  flagging of prior results with similar values performed with this method. Interpretation of those prior results can be made in the context of the updated reference intervals.    ALT 33 0 - 70 U/L      Comment:      Reference intervals for this test were updated on 6/12/2023 to more accurately reflect our healthy population. There may be differences in the flagging of prior results with similar values performed with this method. Interpretation of those prior results can be made in the context of the updated reference intervals.      Protein Total 7.4 6.4 - 8.3 g/dL    Albumin 4.5 3.5 - 5.2 g/dL    Bilirubin Total 0.9 <=1.2 mg/dL   TSH with free T4 reflex   Result Value Ref Range    TSH 1.04 0.30 - 4.20 uIU/mL   Magnesium   Result Value Ref Range    Magnesium 2.3 1.7 - 2.3 mg/dL       If you have any questions or concerns, please call the clinic at the number listed above.       Sincerely,      Larry Mckeon MD             No

## 2024-12-16 DIAGNOSIS — I10 ESSENTIAL HYPERTENSION, BENIGN: ICD-10-CM

## 2024-12-16 NOTE — TELEPHONE ENCOUNTER
"Patient seen by Dr. Mckeon 07/02/2024. Note from visit states:  \"3. Essential hypertension  Satisfactory control with losartan, with recently added hydrochlorthiazide.\"  "

## 2024-12-17 RX ORDER — LOSARTAN POTASSIUM 100 MG/1
100 TABLET ORAL DAILY
Qty: 90 TABLET | Refills: 3 | Status: SHIPPED | OUTPATIENT
Start: 2024-12-17

## 2025-01-28 ENCOUNTER — TELEPHONE (OUTPATIENT)
Dept: FAMILY MEDICINE | Facility: CLINIC | Age: 70
End: 2025-01-28
Payer: MEDICARE

## 2025-01-28 NOTE — TELEPHONE ENCOUNTER
Patient Quality Outreach    Patient is due for the following:   Physical Annual Wellness Visit      Topic Date Due    Zoster (Shingles) Vaccine (2 of 3) 07/22/2016    Flu Vaccine (1) 09/01/2024    COVID-19 Vaccine (7 - 2024-25 season) 09/01/2024       Action(s) Taken:   Schedule a Annual Wellness Visit    Type of outreach:    Phone, left message for patient/parent to call back.    Questions for provider review:    None           Eulogio Young  Chart routed to Care Team.

## 2025-02-01 ENCOUNTER — HEALTH MAINTENANCE LETTER (OUTPATIENT)
Age: 70
End: 2025-02-01

## 2025-02-20 ENCOUNTER — OFFICE VISIT (OUTPATIENT)
Dept: INTERNAL MEDICINE | Facility: CLINIC | Age: 70
End: 2025-02-20
Payer: MEDICARE

## 2025-02-20 VITALS
RESPIRATION RATE: 18 BRPM | OXYGEN SATURATION: 98 % | HEIGHT: 67 IN | SYSTOLIC BLOOD PRESSURE: 174 MMHG | TEMPERATURE: 97 F | HEART RATE: 73 BPM | WEIGHT: 176 LBS | BODY MASS INDEX: 27.62 KG/M2 | DIASTOLIC BLOOD PRESSURE: 87 MMHG

## 2025-02-20 DIAGNOSIS — R25.2 LEG CRAMPS: ICD-10-CM

## 2025-02-20 DIAGNOSIS — E83.42 HYPOMAGNESEMIA: ICD-10-CM

## 2025-02-20 DIAGNOSIS — I10 ESSENTIAL HYPERTENSION: Primary | ICD-10-CM

## 2025-02-20 LAB
ANION GAP SERPL CALCULATED.3IONS-SCNC: 12 MMOL/L (ref 7–15)
BUN SERPL-MCNC: 16.1 MG/DL (ref 8–23)
CALCIUM SERPL-MCNC: 10.1 MG/DL (ref 8.8–10.4)
CHLORIDE SERPL-SCNC: 102 MMOL/L (ref 98–107)
CREAT SERPL-MCNC: 1.03 MG/DL (ref 0.67–1.17)
EGFRCR SERPLBLD CKD-EPI 2021: 78 ML/MIN/1.73M2
GLUCOSE SERPL-MCNC: 107 MG/DL (ref 70–99)
HCO3 SERPL-SCNC: 25 MMOL/L (ref 22–29)
MAGNESIUM SERPL-MCNC: 2.2 MG/DL (ref 1.7–2.3)
POTASSIUM SERPL-SCNC: 4.2 MMOL/L (ref 3.4–5.3)
SODIUM SERPL-SCNC: 139 MMOL/L (ref 135–145)

## 2025-02-20 RX ORDER — MAGNESIUM OXIDE 400 MG/1
200 TABLET ORAL DAILY
Qty: 45 TABLET | Refills: 3 | Status: SHIPPED | OUTPATIENT
Start: 2025-02-20

## 2025-02-20 RX ORDER — HYDROCHLOROTHIAZIDE 12.5 MG/1
12.5 TABLET ORAL DAILY
Qty: 90 TABLET | Refills: 0 | Status: SHIPPED | OUTPATIENT
Start: 2025-02-20

## 2025-02-20 RX ORDER — AMLODIPINE BESYLATE 5 MG/1
5 TABLET ORAL DAILY
Qty: 90 TABLET | Refills: 3 | Status: SHIPPED | OUTPATIENT
Start: 2025-02-20

## 2025-02-20 ASSESSMENT — PAIN SCALES - GENERAL: PAINLEVEL_OUTOF10: MILD PAIN (3)

## 2025-02-20 NOTE — PROGRESS NOTES
ASSESSMENT AND PLAN:    1. Essential hypertension (Primary)  Needs better control.  Will add amlodipine 5 mg po daily.  He will monitor his Bp and let us know if not adequately controled.    - hydroCHLOROthiazide 12.5 MG tablet; Take 1 tablet (12.5 mg) by mouth daily.  Dispense: 90 tablet; Refill: 0  - amLODIPine (NORVASC) 5 MG tablet; Take 1 tablet (5 mg) by mouth daily.  Dispense: 90 tablet; Refill: 3  - Basic metabolic panel  (Ca, Cl, CO2, Creat, Gluc, K, Na, BUN); Future  - Magnesium; Future    2. Leg cramps  Intermittent right calf cramps. Will test electrolytes and potassium, and add oral magnesium oxide 200 mg po at bedtime.  Adding amlodipine may also help with leg cramps     Follow up in 6 months and as needed.     The longitudinal plan of care for the diagnosis(es)/condition(s) as documented were addressed during this visit. Due to the added complexity in care, I will continue to support Darion in the subsequent management and with ongoing continuity of care.    CHIEF COMPLAINT:  Follow up hypertension and leg cramps.     HISTORY OF PRESENT ILLNESS:  Darion Day is a 69 year old male has been well but having leg cramps at night.  No claudication has occurred. The cramps seem mor likely to occur after a lot of standing or walking.      REVIEW OF SYSTEMS:   See HPI, all other systems on review are negative.    Past Medical History:   Diagnosis Date    Essential hypertension     Fracture, rib     History of colonic polyps 02/08/2024    Internal hernia     mesenteric paraduodenal and sigmoid    Leg cramps     Ophthalmic herpes zoster     RBBB      History   Smoking Status    Former    Types: Cigarettes   Smokeless Tobacco    Never     Family History   Problem Relation Age of Onset    Pancreatic Cancer Mother     Cancer Father     Coronary Artery Disease No family hx of     Diabetes No family hx of     Hypertension No family hx of      Past Surgical History:   Procedure Laterality Date    NO PAST SURGERIES    "    Allergies   Allergen Reactions    Lisinopril Cough     VITALS:  Vitals:    02/20/25 0938 02/20/25 0941   BP: (!) 176/95 (!) 174/87   BP Location: Left arm Left arm   Patient Position: Sitting Sitting   Cuff Size: Adult Regular Adult Regular   Pulse: 69 73   Resp: 18    Temp: 97  F (36.1  C)    SpO2: 98%    Weight: 79.8 kg (176 lb)    Height: 1.702 m (5' 7\")      Estimated body mass index is 27.57 kg/m  as calculated from the following:    Height as of this encounter: 1.702 m (5' 7\").    Weight as of this encounter: 79.8 kg (176 lb).  Wt Readings from Last 3 Encounters:   02/20/25 79.8 kg (176 lb)   07/02/24 80.2 kg (176 lb 14.4 oz)   02/08/24 79.4 kg (175 lb)     PHYSICAL EXAM:  Constitutional:  In NAD, alert and oriented  Neck: no cervical or axillary adenopathy  Cardiac:  S1 S2   Lungs: Clear   Abdomen:   Soft, flat and non-tender  Extremities:  no edema, distal pulses are intact.     DECISION TO OBTAIN OLD RECORDS AND/OR OBTAIN HISTORY FROM SOMEONE OTHER THAN PATIENT, AND/OR ACCESSING CARE EVERYWHERE):  1  0     REVIEW AND SUMMARIZATION OF OLD RECORDS, AND/OR OBTAINING HISTORY FROM SOMEONE OTHER THAN PATIENT, AND/OR DISCUSSION OF CASE WITH ANOTHER HEALTH CARE PROVIDER:  2 reviewed past health notes    REVIEW AND/OR ORDER OF OF CLINICAL LAB TESTS: 1  ordered.    REVIEW AND/OR ORDER OF RADIOLOGY TESTS: 1 0.    REVIEW AND/OR ORDER OF MEDICAL TESTS (EKG/ECHO/COLONOSCOPY/EGD): 1  0    INDEPENDENT  VISUALIZATION OF IMAGE, TRACING, OR SPECIMEN ITSELF (2 EACH):  0     TOTAL: 3    Current Outpatient Medications   Medication Sig Dispense Refill    amLODIPine (NORVASC) 5 MG tablet Take 1 tablet (5 mg) by mouth daily. 90 tablet 3    hydroCHLOROthiazide 12.5 MG tablet Take 1 tablet (12.5 mg) by mouth daily. 90 tablet 0    losartan (COZAAR) 100 MG tablet Take 1 tablet (100 mg) by mouth daily. 90 tablet 3    polyethylene glycol (MIRALAX) 17 GM/Dose powder Take 17 g by mouth 2 times daily        Larry Mckeon MD  Internal " Medicine  [facility]

## 2025-02-20 NOTE — LETTER
February 20, 2025      Darion Campuzanony  [address]  [facility] [id number]        Dear ,    We are writing to inform you of your test results.    Your tests are good. The minor elevation of glucose is not significant.  Dr. Gilmore says hello!      Resulted Orders   Basic metabolic panel  (Ca, Cl, CO2, Creat, Gluc, K, Na, BUN)   Result Value Ref Range    Sodium 139 135 - 145 mmol/L    Potassium 4.2 3.4 - 5.3 mmol/L    Chloride 102 98 - 107 mmol/L    Carbon Dioxide (CO2) 25 22 - 29 mmol/L    Anion Gap 12 7 - 15 mmol/L    Urea Nitrogen 16.1 8.0 - 23.0 mg/dL    Creatinine 1.03 0.67 - 1.17 mg/dL    GFR Estimate 78 >60 mL/min/1.73m2      Comment:      eGFR calculated using 2021 CKD-EPI equation.    Calcium 10.1 8.8 - 10.4 mg/dL    Glucose 107 (H) 70 - 99 mg/dL   Magnesium   Result Value Ref Range    Magnesium 2.2 1.7 - 2.3 mg/dL       If you have any questions or concerns, please call the clinic at the number listed above.       Sincerely,      Larry Mckeon MD    Electronically signed

## 2025-06-26 ENCOUNTER — PATIENT OUTREACH (OUTPATIENT)
Dept: CARE COORDINATION | Facility: CLINIC | Age: 70
End: 2025-06-26
Payer: MEDICARE

## 2025-07-23 DIAGNOSIS — I10 ESSENTIAL HYPERTENSION: ICD-10-CM

## 2025-07-24 RX ORDER — HYDROCHLOROTHIAZIDE 12.5 MG/1
12.5 TABLET ORAL DAILY
Qty: 90 TABLET | Refills: 0 | Status: SHIPPED | OUTPATIENT
Start: 2025-07-24